# Patient Record
Sex: MALE | Race: WHITE | Employment: FULL TIME | URBAN - METROPOLITAN AREA
[De-identification: names, ages, dates, MRNs, and addresses within clinical notes are randomized per-mention and may not be internally consistent; named-entity substitution may affect disease eponyms.]

---

## 2017-02-27 ENCOUNTER — OFFICE VISIT (OUTPATIENT)
Dept: FAMILY MEDICINE | Facility: CLINIC | Age: 42
End: 2017-02-27
Payer: COMMERCIAL

## 2017-02-27 VITALS
HEIGHT: 70 IN | OXYGEN SATURATION: 96 % | DIASTOLIC BLOOD PRESSURE: 78 MMHG | WEIGHT: 270 LBS | BODY MASS INDEX: 38.65 KG/M2 | SYSTOLIC BLOOD PRESSURE: 118 MMHG | TEMPERATURE: 97.6 F | HEART RATE: 69 BPM

## 2017-02-27 DIAGNOSIS — F32.1 MAJOR DEPRESSIVE DISORDER, SINGLE EPISODE, MODERATE (H): ICD-10-CM

## 2017-02-27 DIAGNOSIS — E78.5 HYPERLIPIDEMIA LDL GOAL <100: ICD-10-CM

## 2017-02-27 DIAGNOSIS — F41.9 ANXIETY: ICD-10-CM

## 2017-02-27 DIAGNOSIS — R73.03 PREDIABETES: Primary | ICD-10-CM

## 2017-02-27 DIAGNOSIS — Z51.81 MEDICATION MONITORING ENCOUNTER: ICD-10-CM

## 2017-02-27 LAB
ANION GAP SERPL CALCULATED.3IONS-SCNC: 3 MMOL/L (ref 3–14)
BUN SERPL-MCNC: 22 MG/DL (ref 7–30)
CALCIUM SERPL-MCNC: 9.1 MG/DL (ref 8.5–10.1)
CHLORIDE SERPL-SCNC: 104 MMOL/L (ref 94–109)
CHOLEST SERPL-MCNC: 152 MG/DL
CO2 SERPL-SCNC: 32 MMOL/L (ref 20–32)
CREAT SERPL-MCNC: 1.03 MG/DL (ref 0.66–1.25)
GFR SERPL CREATININE-BSD FRML MDRD: 79 ML/MIN/1.7M2
GLUCOSE SERPL-MCNC: 112 MG/DL (ref 70–99)
HBA1C MFR BLD: 4.8 % (ref 4.3–6)
HDLC SERPL-MCNC: 38 MG/DL
LDLC SERPL CALC-MCNC: 91 MG/DL
NONHDLC SERPL-MCNC: 114 MG/DL
POTASSIUM SERPL-SCNC: 4.1 MMOL/L (ref 3.4–5.3)
SODIUM SERPL-SCNC: 139 MMOL/L (ref 133–144)
TRIGL SERPL-MCNC: 114 MG/DL

## 2017-02-27 PROCEDURE — 80061 LIPID PANEL: CPT | Performed by: FAMILY MEDICINE

## 2017-02-27 PROCEDURE — 99214 OFFICE O/P EST MOD 30 MIN: CPT | Performed by: FAMILY MEDICINE

## 2017-02-27 PROCEDURE — 80048 BASIC METABOLIC PNL TOTAL CA: CPT | Performed by: FAMILY MEDICINE

## 2017-02-27 PROCEDURE — 83036 HEMOGLOBIN GLYCOSYLATED A1C: CPT | Performed by: FAMILY MEDICINE

## 2017-02-27 PROCEDURE — 36415 COLL VENOUS BLD VENIPUNCTURE: CPT | Performed by: FAMILY MEDICINE

## 2017-02-27 ASSESSMENT — ANXIETY QUESTIONNAIRES
1. FEELING NERVOUS, ANXIOUS, OR ON EDGE: SEVERAL DAYS
6. BECOMING EASILY ANNOYED OR IRRITABLE: SEVERAL DAYS
2. NOT BEING ABLE TO STOP OR CONTROL WORRYING: NOT AT ALL
GAD7 TOTAL SCORE: 3
5. BEING SO RESTLESS THAT IT IS HARD TO SIT STILL: NOT AT ALL
7. FEELING AFRAID AS IF SOMETHING AWFUL MIGHT HAPPEN: NOT AT ALL
IF YOU CHECKED OFF ANY PROBLEMS ON THIS QUESTIONNAIRE, HOW DIFFICULT HAVE THESE PROBLEMS MADE IT FOR YOU TO DO YOUR WORK, TAKE CARE OF THINGS AT HOME, OR GET ALONG WITH OTHER PEOPLE: NOT DIFFICULT AT ALL
3. WORRYING TOO MUCH ABOUT DIFFERENT THINGS: SEVERAL DAYS

## 2017-02-27 ASSESSMENT — PATIENT HEALTH QUESTIONNAIRE - PHQ9: 5. POOR APPETITE OR OVEREATING: NOT AT ALL

## 2017-02-27 NOTE — NURSING NOTE
"Chief Complaint   Patient presents with     RECHECK       Initial /78  Pulse 69  Temp 97.6  F (36.4  C) (Oral)  Ht 5' 10\" (1.778 m)  Wt 270 lb (122.5 kg)  SpO2 96%  BMI 38.74 kg/m2 Estimated body mass index is 38.74 kg/(m^2) as calculated from the following:    Height as of this encounter: 5' 10\" (1.778 m).    Weight as of this encounter: 270 lb (122.5 kg)..  BP completed using cuff size: stefani Nix MA  "

## 2017-02-27 NOTE — PATIENT INSTRUCTIONS
2/27/17    Labs Ordered     [facility] [place]                        To reach your care team during and after hours:   [phone]  To reach our pharmacy:        [phone]    Clinic Hours                        Our clinic hours are:    Monday   7:30 am to 7:00 pm                  Tuesday through Friday 7:30 am to 5:00 pm                             Saturday   8:00 am to 12:00 pm      Sunday   Closed      Pharmacy Hours                        Our pharmacy hours are:    Monday   8:30 am to 7:00 pm       Tuesday to Friday  8:30 am to 6:00 pm                       Saturday    9:00 am to 1:00 pm              Sunday    Closed              There is also information available at our web site:  [email or web address]    If your provider ordered any lab tests and you do not receive the results within 10 business days, please call the clinic.    If you need a medication refill please contact your pharmacy.  Please allow 2-3 business days for your refill to be completed.    Our clinic offers telephone visits and e visits.  Please ask one of your team members to explain more.      Use [employer] (secure email communication and access to your chart) to send your primary care provider a message or make an appointment. Ask someone on your Team how to sign up for [employer].  Immunizations                      Immunization History   Administered Date(s) Administered     DPT 1975, 1975, 01/02/1976, 03/24/1978     Hepatitis B 02/19/1996, 03/16/1996     Influenza (H1N1) 01/06/2010     Influenza (IIV3) 10/27/2007, 11/15/2008, 11/21/2009, 10/02/2012     MMR 02/14/1977, 05/08/1990     OPV 1975, 1975, 01/02/1976, 04/24/1978     TD (ADULT, 7+) 03/24/1988, 07/29/2010, 12/16/2016     TDAP (ADACEL AGES 11-64) 01/25/2007     Varicella Not Indicated - By Hx 01/01/1984        Health Maintenance                         Health Maintenance Due   Topic Date Due     Flu Vaccine - yearly  09/01/2016

## 2017-02-27 NOTE — PROGRESS NOTES
SUBJECTIVE:                                                    Vlad Burgess is a 41 year old male who presents to clinic today for the following health issues:    Follow up on labs - elevated glucose and lipids - recent diet changes, weight down 28 lbs, stopped pop and decreased carbs - increased exercise - job promotion with improved work situatiuon    GAD7: (2) 3  The anxiety may be a little higher just received a promotion with more money.  However he does have more work which is what makes him slightly anxious.       PHQ9: (5) 3  Depression has been a lot better on the Zoloft.    Zoloft helps reduce the extreme's of the peaks and valleys.       Blood Pressure  BP Readings from Last 3 Encounters:   02/27/17 118/78   12/23/16 126/82   12/16/16 136/88       Lipids  Recent Labs   Lab Test  12/23/16   0825  02/16/15   1047   CHOL  172  158   HDL  39*  39*   LDL  104*  76   TRIG  143  215*   CHOLHDLRATIO   --   4.1     Creatinine   Date Value Ref Range Status   12/23/2016 0.88 0.66 - 1.25 mg/dL Final     Pre-Diabetes   Glucose   Date Value Ref Range Status   12/23/2016 123 (H) 70 - 99 mg/dL Final     Wt Readings from Last 5 Encounters:   02/27/17 270 lb (122.5 kg)   12/16/16 298 lb (135.2 kg)   01/18/16 289 lb (131.1 kg)   10/16/15 289 lb (131.1 kg)   03/28/15 292 lb (132.5 kg)       Problem list and histories reviewed & adjusted, as indicated.  Additional history: as documented    BP Readings from Last 3 Encounters:   02/27/17 118/78   12/23/16 126/82   12/16/16 136/88       Wt Readings from Last 4 Encounters:   02/27/17 270 lb (122.5 kg)   12/16/16 298 lb (135.2 kg)   01/18/16 289 lb (131.1 kg)   10/16/15 289 lb (131.1 kg)       Health Maintenance    Health Maintenance Due   Topic Date Due     INFLUENZA VACCINE (SYSTEM ASSIGNED)  09/01/2016       Current Problem List    Patient Active Problem List   Diagnosis     Psoriasis     Major depressive disorder, single episode, moderate (H)     Leg length discrepancy      Anxiety     Prediabetes     Hyperlipidemia LDL goal <100       Past Medical History    Past Medical History   Diagnosis Date     Alopecia areata 10/07     dr thomason     Anxiety      Hyperlipidemia LDL goal <100      Leg length discrepancy      Major depressive disorder, single episode, moderate (H)      Prediabetes      Psoriasis 2007       Past Surgical History    Past Surgical History   Procedure Laterality Date     Surgical history of -   2002     wisdom teeth     Pe tubes  1983       Current Medications    Current Outpatient Prescriptions   Medication Sig Dispense Refill     sertraline (ZOLOFT) 50 MG tablet Take 1 tablet (50 mg) by mouth daily 90 tablet 3       Allergies    Allergies   Allergen Reactions     Penicillins Hives       Immunizations    Immunization History   Administered Date(s) Administered     DPT 1975, 1975, 01/02/1976, 03/24/1978     Hepatitis B 02/19/1996, 03/16/1996     Influenza (H1N1) 01/06/2010     Influenza (IIV3) 10/27/2007, 11/15/2008, 11/21/2009, 10/02/2012     MMR 02/14/1977, 05/08/1990     OPV 1975, 1975, 01/02/1976, 04/24/1978     TD (ADULT, 7+) 03/24/1988, 07/29/2010, 12/16/2016     TDAP (ADACEL AGES 11-64) 01/25/2007     Varicella Not Indicated - By Hx 01/01/1984       Family History    Family History   Problem Relation Age of Onset     DIABETES Father      C.A.D. Father 72     CANCER Mother      lung cancer - age 45     C.A.D. Paternal Grandfather      age 40's     C.A.D. Maternal Grandmother      CEREBROVASCULAR DISEASE Maternal Grandfather        Social History    Social History     Social History     Marital status:      Spouse name: Suzan     Number of children: 3     Years of education: 14     Occupational History      [employer]     Social History Main Topics     Smoking status: Former Smoker     Packs/day: 0.50     Years: 15.00     Types: Cigarettes     Quit date: 10/14/2007     Smokeless tobacco: Never Used     Alcohol use 1.0 -  1.5 oz/week     2 - 3 Standard drinks or equivalent per week      Comment: 2-3 drinks per week avg     Drug use: No     Sexual activity: Yes     Partners: Female     Other Topics Concern     Caffeine Concern Yes     1-2 cans qd     Exercise Yes     work physical     Seat Belt Yes     Parent/Sibling W/ Cabg, Mi Or Angioplasty Before 65f 55m? No     Social History Narrative       Health Maintenance     Colonoscopy:  Due at 50   FIT:  Yearly Last 12/16 Due 12/17              PSA:  Yearly Last 12/16 Due 12/17   DEXA:  N/A    Health Maintenance Due   Topic Date Due     INFLUENZA VACCINE (SYSTEM ASSIGNED)  09/01/2016       Current Problem List    Patient Active Problem List   Diagnosis     Psoriasis     Major depressive disorder, single episode, moderate (H)     Leg length discrepancy     Anxiety     Prediabetes     Hyperlipidemia LDL goal <100       Past Medical History    Past Medical History   Diagnosis Date     Alopecia areata 10/07     dr thomason     Anxiety      Hyperlipidemia LDL goal <100      Leg length discrepancy      Major depressive disorder, single episode, moderate (H)      Prediabetes      Psoriasis 2007       Past Surgical History    Past Surgical History   Procedure Laterality Date     Surgical history of -   2002     wisdom teeth     Pe tubes  1983       Current Medications    Current Outpatient Prescriptions   Medication Sig Dispense Refill     sertraline (ZOLOFT) 50 MG tablet Take 1 tablet (50 mg) by mouth daily 90 tablet 3       Allergies    Allergies   Allergen Reactions     Penicillins Hives       Immunizations    Immunization History   Administered Date(s) Administered     DPT 1975, 1975, 01/02/1976, 03/24/1978     Hepatitis B 02/19/1996, 03/16/1996     Influenza (H1N1) 01/06/2010     Influenza (IIV3) 10/27/2007, 11/15/2008, 11/21/2009, 10/02/2012     MMR 02/14/1977, 05/08/1990     OPV 1975, 1975, 01/02/1976, 04/24/1978     TD (ADULT, 7+) 03/24/1988, 07/29/2010, 12/16/2016  "    TDAP (ADACEL AGES 11-64) 01/25/2007     Varicella Not Indicated - By Hx 01/01/1984       Family History    Family History   Problem Relation Age of Onset     DIABETES Father      C.A.D. Father 72     CANCER Mother      lung cancer - age 45     C.A.D. Paternal Grandfather      age 40's     C.A.D. Maternal Grandmother      CEREBROVASCULAR DISEASE Maternal Grandfather        Social History    Social History     Social History     Marital status:      Spouse name: Suzan     Number of children: 3     Years of education: 14     Occupational History      [employer]     Social History Main Topics     Smoking status: Former Smoker     Packs/day: 0.50     Years: 15.00     Types: Cigarettes     Quit date: 10/14/2007     Smokeless tobacco: Never Used     Alcohol use 1.0 - 1.5 oz/week     2 - 3 Standard drinks or equivalent per week      Comment: 2-3 drinks per week avg     Drug use: No     Sexual activity: Yes     Partners: Female     Other Topics Concern     Caffeine Concern Yes     1-2 cans qd     Exercise Yes     work physical     Seat Belt Yes     Parent/Sibling W/ Cabg, Mi Or Angioplasty Before 65f 55m? No     Social History Narrative         All above reviewed and updated, all stable unless otherwise noted    Recent labs reviewed    ROS:  Constitutional, HEENT, cardiovascular, pulmonary, GI, , musculoskeletal, neuro, skin, endocrine and psych systems are negative, except as in HPI or otherwise noted       OBJECTIVE:                                                    /78  Pulse 69  Temp 97.6  F (36.4  C) (Oral)  Ht 5' 10\" (1.778 m)  Wt 270 lb (122.5 kg)  SpO2 96%  BMI 38.74 kg/m2  Body mass index is 38.74 kg/(m^2).  GENERAL: healthy, alert and no distress Obese  EYES: Eyes grossly normal to inspection, extraocular movements - intact, and PERRL  HENT: ear canals- normal; TMs- normal; Nose- normal; Mouth- no ulcers, no lesions  NECK: no tenderness, no adenopathy, no asymmetry, no masses, no " stiffness; thyroid- normal to palpation  RESP: lungs clear to auscultation - no rales, no rhonchi, no wheezes  CV: regular rates and rhythm, normal S1 S2, no S3 or S4 and no murmur, no click or rub -  ABDOMEN: soft, no tenderness, no  hepatosplenomegaly, no masses, normal bowel sounds  MS: extremities- no gross deformities noted, no edema  SKIN: no suspicious lesions, no rashes  NEURO: strength and tone- normal, sensory exam- grossly normal, mentation- intact, speech- normal, reflexes- symmetric  BACK: no CVA tenderness, no paralumbar tenderness  PSYCH: Alert and oriented times 3; speech- coherent , normal rate and volume; able to articulate logical thoughts, able to abstract reason, no tangential thoughts, no hallucinations or delusions, affect- normal    DIAGNOSTICS/PROCEDURES:                                                      Reviewed      ASSESSMENT/PLAN:                                                        ICD-10-CM    1. Prediabetes R73.03 Basic metabolic panel  (Ca, Cl, CO2, Creat, Gluc, K, Na, BUN)     Lipid panel reflex to direct LDL     Hemoglobin A1c   2. Hyperlipidemia LDL goal <100 E78.5 Lipid panel reflex to direct LDL   3. Major depressive disorder, single episode, moderate (H) F32.1    4. Anxiety F41.9    5. Medication monitoring encounter Z51.81 Basic metabolic panel  (Ca, Cl, CO2, Creat, Gluc, K, Na, BUN)     Lipid panel reflex to direct LDL     Hemoglobin A1c       Discussed treatment/modality options, including risk and benefits, he desires advised alcohol consumption 1oz per day or less, advised 1 multivitamin per day, advised calcium 6656-7530 mg/d and Vitamin D 800-1200 IU/d, advised dentist every 6 months, advised diet, exercise, and weight loss, advised opthalmologist every 1-2 years, advised self testicular exam q month, further health care maintenance, further lab(s), LAWRENCE 7, completed and reviewed, PHQ-9, Depression Action Plan, completed and reviewed and observation. All diagnosis  above reviewed and noted above, otherwise stable.  See EpicCare orders for further details.  Follow up in 4-6 months and prn.    Labs ordered, PHQ9 completed, LAWRENCE 7 completed, continued diet, weight loss and exercise    Health Maintenance Due   Topic Date Due     INFLUENZA VACCINE (SYSTEM ASSIGNED)  09/01/2016       See Patient Instructions    This document serves as a record of the services and decisions personally performed and made by Rex Yee MD [facility]. It was created on their behalf by Lino Tsai, a trained medical scribe. The creation of this document is based the provider's statements to the medical scribe.  Lino Tsai February 27, 2017 7:55 AM               Rex Yee MD [address]  [id number] [phone] Fax

## 2017-02-27 NOTE — MR AVS SNAPSHOT
After Visit Summary   2/27/2017    Vlad Burgess    MRN: [id number]           Patient Information     Date Of Birth          1975        Visit Information        Provider Department      2/27/2017 7:40 AM Rex Yee MD [facility]        Today's Diagnoses     Prediabetes    -  1    Hyperlipidemia LDL goal <100        Major depressive disorder, single episode, moderate (H)        Anxiety        Medication monitoring encounter          Care Instructions    2/27/17    Labs Ordered     [facility]                        To reach your care team during and after hours:   [phone]  To reach our pharmacy:        [phone]    Clinic Hours                        Our clinic hours are:    Monday   7:30 am to 7:00 pm                  Tuesday through Friday 7:30 am to 5:00 pm                             Saturday   8:00 am to 12:00 pm      Sunday   Closed      Pharmacy Hours                        Our pharmacy hours are:    Monday   8:30 am to 7:00 pm       Tuesday to Friday  8:30 am to 6:00 pm                       Saturday    9:00 am to 1:00 pm              Sunday    Closed              There is also information available at our web site:  [email or web address]    If your provider ordered any lab tests and you do not receive the results within 10 business days, please call the clinic.    If you need a medication refill please contact your pharmacy.  Please allow 2-3 business days for your refill to be completed.    Our clinic offers telephone visits and e visits.  Please ask one of your team members to explain more.      Use [facility] (secure email communication and access to your chart) to send your primary care provider a message or make an appointment. Ask someone on your Team how to sign up for [facility].  Immunizations                      Immunization History   Administered Date(s) Administered     DPT 1975, 1975, 01/02/1976, 03/24/1978     Hepatitis B  "1996, 1996     Influenza (H1N1) 2010     Influenza (IIV3) 10/27/2007, 11/15/2008, 2009, 10/02/2012     MMR 1977, 1990     OPV 1975, 1975, 1976, 1978     TD (ADULT, 7+) 1988, 2010, 2016     TDAP (ADACEL AGES 11-64) 2007     Varicella Not Indicated - By Hx 1984        Health Maintenance                         Health Maintenance Due   Topic Date Due     Flu Vaccine - yearly  2016             Follow-ups after your visit        Who to contact     If you have questions or need follow up information about today's clinic visit or your schedule please contact [facility] [place] directly at [phone].  Normal or non-critical lab and imaging results will be communicated to you by MyChart, letter or phone within 4 business days after the clinic has received the results. If you do not hear from us within 7 days, please contact the clinic through [employer] or phone. If you have a critical or abnormal lab result, we will notify you by phone as soon as possible.  Submit refill requests through [employer] or call your pharmacy and they will forward the refill request to us. Please allow 3 business days for your refill to be completed.          Additional Information About Your Visit        [employer] Information     [employer] lets you send messages to your doctor, view your test results, renew your prescriptions, schedule appointments and more. To sign up, go to [email or web address] . Click on \"Log in\" on the left side of the screen, which will take you to the Welcome page. Then click on \"Sign up Now\" on the right side of the page.     You will be asked to enter the access code listed below, as well as some personal information. Please follow the directions to create your username and password.     Your access code is: [email or web address]  Expires: 3/16/2017  4:28 PM     Your access code will  in 90 days. If you need help or a new " "code, please call your [facility] clinic or [phone].        Care EveryWhere ID     This is your Care EveryWhere ID. This could be used by other organizations to access your [facility] medical records  [id number]        Your Vitals Were     Pulse Temperature Height Pulse Oximetry BMI (Body Mass Index)       69 97.6  F (36.4  C) (Oral) 5' 10\" (1.778 m) 96% 38.74 kg/m2        Blood Pressure from Last 3 Encounters:   02/27/17 118/78   12/23/16 126/82   12/16/16 136/88    Weight from Last 3 Encounters:   02/27/17 270 lb (122.5 kg)   12/16/16 298 lb (135.2 kg)   01/18/16 289 lb (131.1 kg)              Today, you had the following     No orders found for display       Primary Care Provider Office Phone # Fax #    Rex Yee [phone]       [address] [address]        Thank you!     Thank you for choosing [facility]  for your care. Our goal is always to provide you with excellent care. Hearing back from our patients is one way we can continue to improve our services. Please take a few minutes to complete the written survey that you may receive in the mail after your visit with us. Thank you!             Your Updated Medication List - Protect others around you: Learn how to safely use, store and throw away your medicines at www.disposemymeds.org.          This list is accurate as of: 2/27/17  8:19 AM.  Always use your most recent med list.                   Brand Name Dispense Instructions for use    sertraline 50 MG tablet    ZOLOFT    90 tablet    Take 1 tablet (50 mg) by mouth daily         "

## 2017-02-27 NOTE — LETTER
[facility]  [address] [place] [address]  [place] [address]                  [phone]   February 28, 2017    Vlad Burgess  [address] [place] [id number]      Dear Vlad,    Here is a summary of your recent test results:    Labs are overall improved.     We advise:    Follow up in 3-4 months and as needed.    For additional lab test information, [email or web address] is an excellent reference.    Your test results are enclosed.      Please contact me if you have any questions.    In addition, here is a list of due or overdue Health Maintenance reminders.    Health Maintenance Due   Topic Date Due     Flu Vaccine - yearly  09/01/2016       Please call us at [phone] (or use [employer]) to address the above recommendations.            Thank you very much for trusting [facility]..     Healthy regards,        Rex Yee M.D.        Results for orders placed or performed in visit on 02/27/17   Basic metabolic panel  (Ca, Cl, CO2, Creat, Gluc, K, Na, BUN)   Result Value Ref Range    Sodium 139 133 - 144 mmol/L    Potassium 4.1 3.4 - 5.3 mmol/L    Chloride 104 94 - 109 mmol/L    Carbon Dioxide 32 20 - 32 mmol/L    Anion Gap 3 3 - 14 mmol/L    Glucose 112 (H) 70 - 99 mg/dL    Urea Nitrogen 22 7 - 30 mg/dL    Creatinine 1.03 0.66 - 1.25 mg/dL    GFR Estimate 79 >60 mL/min/1.7m2    GFR Estimate If Black >90   GFR Calc   >60 mL/min/1.7m2    Calcium 9.1 8.5 - 10.1 mg/dL   Lipid panel reflex to direct LDL   Result Value Ref Range    Cholesterol 152 <200 mg/dL    Triglycerides 114 <150 mg/dL    HDL Cholesterol 38 (L) >39 mg/dL    LDL Cholesterol Calculated 91 <100 mg/dL    Non HDL Cholesterol 114 <130 mg/dL   Hemoglobin A1c   Result Value Ref Range    Hemoglobin A1C 4.8 4.3 - 6.0 %

## 2017-02-28 ASSESSMENT — ANXIETY QUESTIONNAIRES: GAD7 TOTAL SCORE: 3

## 2017-02-28 ASSESSMENT — PATIENT HEALTH QUESTIONNAIRE - PHQ9: SUM OF ALL RESPONSES TO PHQ QUESTIONS 1-9: 3

## 2018-02-06 ENCOUNTER — TELEPHONE (OUTPATIENT)
Dept: FAMILY MEDICINE | Facility: CLINIC | Age: 43
End: 2018-02-06

## 2018-02-06 NOTE — TELEPHONE ENCOUNTER
Huddled with Dr Yee and MD wanted to see the pt in clinic tomorrow.  Pt appt booked at 2 pm on Wednesday.    Bev Rajan RN  [employer]

## 2018-02-06 NOTE — TELEPHONE ENCOUNTER
Genitourinary - Male  Onset: groin pain started in September, pain from navel to Testicular are started Saturday after heavy lifting    Description:   Dysuria (painful urination): no   Hematuria (blood in urine): no   Frequency: no   Are you urinating at night : no   Hesitancy (delay in urine): no   Retention (unable to empty): no   Decrease in urinary flow: no   Incontinence: no     Progression of Symptoms:  same    Accompanying Signs & Symptoms:  Fever: no   Back/Flank pain: no   Urethral discharge: no   Testicle lumps/masses/pain: no   Nausea and/or vomiting: no   Abdominal pain: no     History:   History of frequent UTI's: no   History of kidney stones: no   History of hernias: no , denies lumps  Personal or Family history of Prostate problems: no      Precipitating factors:     Pt called stating that he has been experiencing intermittent left groin pain that radiates down his left leg that started last September after falling off of a ladder.  Pt denies feeling any palpable lump.  Pt denies urinary symptoms. Pt states that the pain is intermittent and states that he notices the pain if he is on the ground and tries to lift his leg, states that he will feel a sharp pain and states that he limps for awhile after.  Pt states that the pain is relieved by rest.  Pt also states that he started having pain from his naval to his testicular area that started on Saturday.  Pt states that he was lifting a heavy box when the pain started.  Pt denies feeling a palpable lump, denies abdominal pain, nausea or vomiting, denies difficulty urinating, denies swelling.      Alleviating factors:  Rest    Will huddle with provider for further recommendations.    Bev Rajan RN  [employer]

## 2018-02-07 ENCOUNTER — OFFICE VISIT (OUTPATIENT)
Dept: FAMILY MEDICINE | Facility: CLINIC | Age: 43
End: 2018-02-07
Payer: COMMERCIAL

## 2018-02-07 VITALS
BODY MASS INDEX: 44.52 KG/M2 | HEIGHT: 70 IN | OXYGEN SATURATION: 93 % | WEIGHT: 311 LBS | DIASTOLIC BLOOD PRESSURE: 86 MMHG | SYSTOLIC BLOOD PRESSURE: 130 MMHG | TEMPERATURE: 97.8 F | HEART RATE: 95 BPM

## 2018-02-07 DIAGNOSIS — M54.50 CHRONIC MIDLINE LOW BACK PAIN WITHOUT SCIATICA: ICD-10-CM

## 2018-02-07 DIAGNOSIS — G89.29 CHRONIC MIDLINE LOW BACK PAIN WITHOUT SCIATICA: ICD-10-CM

## 2018-02-07 DIAGNOSIS — F32.1 MAJOR DEPRESSIVE DISORDER, SINGLE EPISODE, MODERATE (H): ICD-10-CM

## 2018-02-07 DIAGNOSIS — E66.01 MORBID OBESITY (H): ICD-10-CM

## 2018-02-07 DIAGNOSIS — M79.605 PAIN OF LEFT LOWER EXTREMITY: Primary | ICD-10-CM

## 2018-02-07 DIAGNOSIS — F41.9 ANXIETY: ICD-10-CM

## 2018-02-07 DIAGNOSIS — Z51.81 MEDICATION MONITORING ENCOUNTER: ICD-10-CM

## 2018-02-07 PROCEDURE — 99214 OFFICE O/P EST MOD 30 MIN: CPT | Performed by: FAMILY MEDICINE

## 2018-02-07 ASSESSMENT — ANXIETY QUESTIONNAIRES
2. NOT BEING ABLE TO STOP OR CONTROL WORRYING: NOT AT ALL
GAD7 TOTAL SCORE: 2
6. BECOMING EASILY ANNOYED OR IRRITABLE: NOT AT ALL
7. FEELING AFRAID AS IF SOMETHING AWFUL MIGHT HAPPEN: SEVERAL DAYS
IF YOU CHECKED OFF ANY PROBLEMS ON THIS QUESTIONNAIRE, HOW DIFFICULT HAVE THESE PROBLEMS MADE IT FOR YOU TO DO YOUR WORK, TAKE CARE OF THINGS AT HOME, OR GET ALONG WITH OTHER PEOPLE: NOT DIFFICULT AT ALL
1. FEELING NERVOUS, ANXIOUS, OR ON EDGE: NOT AT ALL
3. WORRYING TOO MUCH ABOUT DIFFERENT THINGS: SEVERAL DAYS
5. BEING SO RESTLESS THAT IT IS HARD TO SIT STILL: NOT AT ALL

## 2018-02-07 ASSESSMENT — PATIENT HEALTH QUESTIONNAIRE - PHQ9: 5. POOR APPETITE OR OVEREATING: NOT AT ALL

## 2018-02-07 NOTE — PROGRESS NOTES
"  SUBJECTIVE:   Vlad Burgess is a 42 year old male who presents to clinic today for the following health issues:    Anxiety/Depression -- Vlad reported he is most concerned about his overeating habits - he feels he \"is never full\". He is also stressed about a new job, where he has increased responsibility with decreased activity level.     PHQ-9: 6  LAWRENCE-7: 2    Low Back Pain -- Vlad complained of a midline low back pain with \"strong, dull ache\" without radiation. He stated he has issues with stool leaking - but this is ongoing since childhood. He denied any dysuria issues.     Genitourinary - Male  Onset: groin pain started in September, pain from umbilical to testicular area started Saturday after heavy lifting    Description:   Dysuria (painful urination): no   Hematuria (blood in urine): no   Frequency: no   Are you urinating at night : no   Hesitancy (delay in urine): no   Retention (unable to empty): no   Decrease in urinary flow: no   Incontinence: no     Progression of Symptoms:  same    Accompanying Signs & Symptoms:  Fever: no   Back/Flank pain: no   Urethral discharge: no   Testicle lumps/masses/pain: no   Nausea and/or vomiting: no   Abdominal pain: no     History:   History of frequent UTI's: no   History of kidney stones: no   History of hernias: no , denies lumps  Personal or Family history of Prostate problems: no    Precipitating factors:     Pt called stating that he has been experiencing intermittent left groin pain that radiates down his left leg that started last September after falling off of a ladder.  Pt denies feeling any palpable lump.  Pt denies urinary symptoms. Pt states that the pain is intermittent and states that he notices the pain if he is on the ground and tries to lift his leg, states that he will feel a sharp pain and states that he limps for awhile after.  Pt states that the pain is relieved by rest.  Pt also states that he started having pain from his naval to his testicular " area that started on Saturday.  Pt states that he was lifting a heavy box when the pain started.  Pt denies feeling a palpable lump, denies abdominal pain, nausea or vomiting, denies difficulty urinating, denies swelling.      Alleviating factors: Rest      Problem list and histories reviewed & adjusted, as indicated.  Additional history: as documented    Reviewed and updated as needed this visit by clinical staff  Tobacco  Allergies  Meds  Med Hx  Surg Hx  Fam Hx  Soc Hx      Reviewed and updated as needed this visit by Provider       BP Readings from Last 3 Encounters:   02/07/18 130/86   02/27/17 118/78   12/23/16 126/82     Wt Readings from Last 4 Encounters:   02/07/18 (!) 311 lb (141.1 kg)   02/27/17 270 lb (122.5 kg)   12/16/16 298 lb (135.2 kg)   01/18/16 289 lb (131.1 kg)       Health Maintenance    Health Maintenance Due   Topic Date Due     INFLUENZA VACCINE (SYSTEM ASSIGNED)  09/01/2017     PSA Q1 YR  12/23/2017     FIT Q1 YR  12/26/2017     LIPID MONITORING Q1 YEAR  02/27/2018       Current Problem List    Patient Active Problem List   Diagnosis     Psoriasis     Major depressive disorder, single episode, moderate (H)     Leg length discrepancy     Anxiety     Prediabetes     Hyperlipidemia LDL goal <100     Groin pain     Bilateral low back pain without sciatica       Past Medical History    Past Medical History:   Diagnosis Date     Alopecia areata 10/07    dr thomason     Anxiety      Hyperlipidemia LDL goal <100      Leg length discrepancy      Major depressive disorder, single episode, moderate (H)      Prediabetes      Psoriasis 2007       Past Surgical History    Past Surgical History:   Procedure Laterality Date     PE TUBES  1983     SURGICAL HISTORY OF -   2002    wisdom teeth       Current Medications    No current outpatient prescriptions on file.       Allergies    Allergies   Allergen Reactions     Penicillins Hives       Immunizations    Immunization History   Administered Date(s)  Administered     HepB 02/19/1996, 03/16/1996     Historical DTP/aP 1975, 1975, 01/02/1976, 03/24/1978     Influenza (H1N1) 01/06/2010     Influenza (IIV3) PF 10/27/2007, 11/15/2008, 11/21/2009, 10/02/2012     MMR 02/14/1977, 05/08/1990     OPV, trivalent, live 1975, 1975, 01/02/1976, 04/24/1978     TD (ADULT, 7+) 03/24/1988, 07/29/2010, 12/16/2016     TDAP Vaccine (Adacel) 01/25/2007     Varicella Pt Report Hx of Varicella/Chicken Pox 01/01/1984       Family History    Family History   Problem Relation Age of Onset     DIABETES Father      C.A.D. Father 72     CANCER Mother      lung cancer - age 45     C.A.D. Paternal Grandfather      age 40's     C.A.D. Maternal Grandmother      CEREBROVASCULAR DISEASE Maternal Grandfather        Social History    Social History     Social History     Marital status:      Spouse name: Suzan     Number of children: 3     Years of education: 14     Occupational History      [employer]     Social History Main Topics     Smoking status: Former Smoker     Packs/day: 0.50     Years: 15.00     Types: Cigarettes     Quit date: 10/14/2007     Smokeless tobacco: Never Used     Alcohol use 1.0 - 1.5 oz/week     2 - 3 Standard drinks or equivalent per week      Comment: 2-3 drinks per week avg     Drug use: No     Sexual activity: Yes     Partners: Female     Other Topics Concern     Caffeine Concern Yes     1-2 cans qd     Exercise Yes     work physical     Seat Belt Yes     Parent/Sibling W/ Cabg, Mi Or Angioplasty Before 65f 55m? No     Social History Narrative       All above reviewed and updated, all stable unless otherwise noted    Recent labs reviewed    ROS:  Constitutional, HEENT, cardiovascular, pulmonary, GI, , musculoskeletal, neuro, skin, endocrine and psych systems are negative, except as in HPI or otherwise noted     This document serves as a record of the services and decisions personally performed and made by Rex Yee MD  "FAAFP. It was created on their behalf by Lino Aldana, a trained medical scribe. The creation of this document is based the provider's statements to the medical scribe.  Lino Aldana February 7, 2018 2:27 PM      OBJECTIVE:                                                    /86  Pulse 95  Temp 97.8  F (36.6  C) (Oral)  Ht 5' 10\" (1.778 m)  Wt (!) 311 lb (141.1 kg)  SpO2 93%  BMI 44.62 kg/m2  Body mass index is 44.62 kg/(m^2).  GENERAL: healthy, alert and no distress, morbidly obese   RESP: lungs clear to auscultation - no rales, no rhonchi, no wheezes  CV: regular rates and rhythm, normal S1 S2, no S3 or S4 and no murmur, no click or rub -  MS: extremities- no gross deformities noted, no edema  SKIN: no suspicious lesions, no rashes to visible skin  : pain to palpation of left groin, testicles normal without atrophy or masses, no hernias, penis normal without urethral discharge  NEURO: mentation intact and speech normal  PSYCH: affect normal    THORACIC/LUMBAR SPINE  Inspection:    No redness, swelling, overlying skin change, or gross deformity/asymmetry  Range of Motion:     Lumbar flexion within normal limits    Lumbar extension within normal limits    Lumbar rotation within normal limits    Lumbar lateral flexion within normal limits  Special Tests:    Positive: pain with left hip adduction/IR    Negative: bilateral log roll, straight leg raise    DIAGNOSTICS/PROCEDURES:                                                      No results found for this or any previous visit (from the past 24 hour(s)).     ASSESSMENT/PLAN:                                                        ICD-10-CM    1. Pain of left lower extremity M79.605 PHYSICAL THERAPY REFERRAL   2. Chronic midline low back pain without sciatica M54.5 PHYSICAL THERAPY REFERRAL    G89.29    3. Major depressive disorder, single episode, moderate (H) F32.1    4. Anxiety F41.9    5. Morbid obesity (H) E66.01 BARIATRIC ADULT REFERRAL   6. Medication " monitoring encounter Z51.81      Discussed treatment/modality options, including risk and benefits, he desires advised diet, exercise, and weight loss, follow up with another visit, further health care maintenance, heat/ice/stretching & exercise, OTC meds, physical therapy, referrals (Weight Managment), and observation. All diagnosis above reviewed and noted above, otherwise stable.  See [facility] orders for further details.  Follow up as needed.    Health Maintenance Due   Topic Date Due     INFLUENZA VACCINE (SYSTEM ASSIGNED)  09/01/2017     PSA Q1 YR  12/23/2017     FIT Q1 YR  12/26/2017     LIPID MONITORING Q1 YEAR  02/27/2018     Patient Instructions     Follow up with Physical Therapy, as discussed    Heat/ice/stretching as needed    Follow up with N Weight Management, as discussed    Ibuprofen 800 mg every 8 hours, as needed    The information in this document, created by the medical scribe for me, accurately reflects the services I personally performed and the decisions made by me. I have reviewed and approved this document for accuracy.   Rex Yee MD FAAFP            Rex Yee MD [address]  [phone] Fax

## 2018-02-07 NOTE — MR AVS SNAPSHOT
After Visit Summary   2/7/2018    Vlad Burgess    MRN: [id number]           Patient Information     Date Of Birth          1975        Visit Information        Provider Department      2/7/2018 2:00 PM Rex Yee MD [facility]        Today's Diagnoses     Pain of left lower extremity    -  1    Chronic midline low back pain without sciatica        Major depressive disorder, single episode, moderate (H)        Anxiety        Morbid obesity (H)        Medication monitoring encounter          Care Instructions    Follow up with Physical Therapy, as discussed    Heat/ice/stretching as needed    Follow up with N Weight Management, as discussed    Ibuprofen 800 mg every 8 hours, as needed      [facility]                        To reach your care team during and after hours:   [phone]  To reach our pharmacy:        [phone]    Clinic Hours                        Our clinic hours are:    Monday   7:30 am to 7:00 pm                  Tuesday through Friday 7:30 am to 5:00 pm                             Saturday   8:00 am to 12:00 pm      Sunday   Closed      Pharmacy Hours                        Our pharmacy hours are:    Monday   8:30 am to 7:00 pm       Tuesday to Friday  8:30 am to 6:00 pm                       Saturday    9:00 am to 1:00 pm              Sunday    Closed              There is also information available at our web site:  [email or web address]    If your provider ordered any lab tests and you do not receive the results within 10 business days, please call the clinic.    If you need a medication refill please contact your pharmacy.  Please allow 2-3 business days for your refill to be completed.    Our clinic offers telephone visits and e visits.  Please ask one of your team members to explain more.      Use [employer] (secure email communication and access to your chart) to send your primary care provider a message or make an appointment. Ask  someone on your Team how to sign up for [employer].  Immunizations                      Immunization History   Administered Date(s) Administered     HepB 02/19/1996, 03/16/1996     Historical DTP/aP 1975, 1975, 01/02/1976, 03/24/1978     Influenza (H1N1) 01/06/2010     Influenza (IIV3) PF 10/27/2007, 11/15/2008, 11/21/2009, 10/02/2012     MMR 02/14/1977, 05/08/1990     OPV, trivalent, live 1975, 1975, 01/02/1976, 04/24/1978     TD (ADULT, 7+) 03/24/1988, 07/29/2010, 12/16/2016     TDAP Vaccine (Adacel) 01/25/2007     Varicella Pt Report Hx of Varicella/Chicken Pox 01/01/1984        Health Maintenance                         Health Maintenance Due   Topic Date Due     Depression Assessment - every 6 months  08/27/2017     Flu Vaccine - yearly  09/01/2017     Depression Action Plan Review - yearly  12/16/2017     Prostate Test (PSA) - yearly  12/23/2017     Colon Cancer Screening - FIT Test - yearly  12/26/2017     Cholesterol Lab - yearly  02/27/2018               Follow-ups after your visit        Additional Services     BARIATRIC ADULT REFERRAL       Your provider has referred you to: [facility]: [facility] [phone]. [email or web address]    Please be aware that coverage of these services is subject to the terms and limitations of your health insurance plan.  Call member services at your health plan with any benefit or coverage questions.      Please bring the following with you to your appointment:      (1) List of current medications   (2) This referral request   (3) Any documents/labs given to you for this referral            PHYSICAL THERAPY REFERRAL       *This therapy referral will be filtered to a centralized scheduling office at [facility] and the patient will receive a call to schedule an appointment at a [place] location most convenient for them. *     Works  "in [place] in [facility] provides Physical Therapy evaluation and treatment and many specialty services across the [facility] system.  If requesting a specialty program, please choose from the list below.    If you have not heard from the scheduling office within 2 business days, please call [phone] for all locations, with the exception of [place], please call [phone].  Treatment: Evaluation & Treatment  Special Instructions/Modalities: PT  Special Programs: None    Please be aware that coverage of these services is subject to the terms and limitations of your health insurance plan.  Call member services at your health plan with any benefit or coverage questions.      **Note to Provider:  If you are referring outside of [facility] for the therapy appointment, please list the name of the location in the \"special instructions\" above, print the referral and give to the patient to schedule the appointment.                  Follow-up notes from your care team     Return if symptoms worsen or fail to improve.      Who to contact     If you have questions or need follow up information about today's clinic visit or your schedule please contact [facility] directly at [phone].  Normal or non-critical lab and imaging results will be communicated to you by [employer], letter or phone within 4 business days after the clinic has received the results. If you do not hear from us within 7 days, please contact the clinic through [employer] or phone. If you have a critical or abnormal lab result, we will notify you by phone as soon as possible.  Submit refill requests through [employer] or call your pharmacy and they will forward the refill request to us. Please allow 3 business days for your refill to be completed.          Additional Information About Your Visit        [employer] Information     [employer] lets you send messages to your doctor, view your test results, renew your " "prescriptions, schedule appointments and more. To sign up, go to [email or web address] . Click on \"Log in\" on the left side of the screen, which will take you to the Welcome page. Then click on \"Sign up Now\" on the right side of the page.     You will be asked to enter the access code listed below, as well as some personal information. Please follow the directions to create your username and password.     Your access code is: [email or web address]  Expires: 2018  2:40 PM     Your access code will  in 90 days. If you need help or a new code, please call your [place] clinic or [phone].        Care EveryWhere ID     This is your Care EveryWhere ID. This could be used by other organizations to access your [place] medical records  [id number]        Your Vitals Were     Pulse Temperature Height Pulse Oximetry BMI (Body Mass Index)       95 97.8  F (36.6  C) (Oral) 5' 10\" (1.778 m) 93% 44.62 kg/m2        Blood Pressure from Last 3 Encounters:   18 130/86   17 118/78   16 126/82    Weight from Last 3 Encounters:   18 (!) 311 lb (141.1 kg)   17 270 lb (122.5 kg)   16 298 lb (135.2 kg)              We Performed the Following     BARIATRIC ADULT REFERRAL     DEPRESSION ACTION PLAN (DAP)     PHYSICAL THERAPY REFERRAL        Primary Care Provider Office Phone # Fax #    Rex Yee [phone] [address] [facility] [facility] [address]        Equal Access to Services     [facility] : Hadrenan Frankel, graeme parrish, lydia young. So [facility] [phone].    ATENCIÓN: Si habla español, tiene a gonzalez disposición servicios gratuitos de asistencia lingüística. Llame al [phone].    We comply with applicable federal civil rights laws and [place] laws. We do not discriminate on the basis of race, color, national origin, age, disability, sex, sexual orientation, or gender identity.          "   Thank you!     Thank you for choosing [facility]  for your care. Our goal is always to provide you with excellent care. Hearing back from our patients is one way we can continue to improve our services. Please take a few minutes to complete the written survey that you may receive in the mail after your visit with us. Thank you!             Your Updated Medication List - Protect others around you: Learn how to safely use, store and throw away your medicines at www.disposemymeds.org.      Notice  As of 2/7/2018  2:40 PM    You have not been prescribed any medications.

## 2018-02-07 NOTE — NURSING NOTE
"Chief Complaint   Patient presents with     Groin Pain       Initial /86  Pulse 95  Temp 97.8  F (36.6  C) (Oral)  Ht 5' 10\" (1.778 m)  Wt (!) 311 lb (141.1 kg)  SpO2 93%  BMI 44.62 kg/m2 Estimated body mass index is 44.62 kg/(m^2) as calculated from the following:    Height as of this encounter: 5' 10\" (1.778 m).    Weight as of this encounter: 311 lb (141.1 kg)..  BP completed using cuff size: stefani Nix MA  "

## 2018-02-07 NOTE — LETTER
My Depression Action Plan  Name: Vlad Burgess   Date of Birth 1975  Date: 2/7/2018    My doctor: Rex Yee   My clinic: [address] [id number] [phone]          GREEN    ZONE   Good Control    What it looks like:     Things are going generally well. You have normal up s and down s. You may even feel depressed from time to time, but bad moods usually last less than a day.   What you need to do:  1. Continue to care for yourself (see self care plan)  2. Check your depression survival kit and update it as needed  3. Follow your physician s recommendations including any medication.  4. Do not stop taking medication unless you consult with your physician first.           YELLOW         ZONE Getting Worse    What it looks like:     Depression is starting to interfere with your life.     It may be hard to get out of bed; you may be starting to isolate yourself from others.    Symptoms of depression are starting to last most all day and this has happened for several days.     You may have suicidal thoughts but they are not constant.   What you need to do:     1. Call your care team, your response to treatment will improve if you keep your care team informed of your progress. Yellow periods are signs an adjustment may need to be made.     2. Continue your self-care, even if you have to fake it!    3. Talk to someone in your support network    4. Open up your depression survival kit           RED    ZONE Medical Alert - Get Help    What it looks like:     Depression is seriously interfering with your life.     You may experience these or other symptoms: You can t get out of bed most days, can t work or engage in other necessary activities, you have trouble taking care of basic hygiene, or basic responsibilities, thoughts of suicide or death that will not go away, self-injurious behavior.     What you need to do:  1. Call your care team and  request a same-day appointment. If they are not available (weekends or after hours) call your local crisis line, emergency room or 911.      Electronically signed by: Pat Nix, February 7, 2018    Depression Self Care Plan / Survival Kit    Self-Care for Depression  Here s the deal. Your body and mind are really not as separate as most people think.  What you do and think affects how you feel and how you feel influences what you do and think. This means if you do things that people who feel good do, it will help you feel better.  Sometimes this is all it takes.  There is also a place for medication and therapy depending on how severe your depression is, so be sure to consult with your medical provider and/ or Behavioral Health Consultant if your symptoms are worsening or not improving.     In order to better manage my stress, I will:    Exercise  Get some form of exercise, every day. This will help reduce pain and release endorphins, the  feel good  chemicals in your brain. This is almost as good as taking antidepressants!  This is not the same as joining a gym and then never going! (they count on that by the way ) It can be as simple as just going for a walk or doing some gardening, anything that will get you moving.      Hygiene   Maintain good hygiene (Get out of bed in the morning, Make your bed, Brush your teeth, Take a shower, and Get dressed like you were going to work, even if you are unemployed).  If your clothes don't fit try to get ones that do.    Diet  I will strive to eat foods that are good for me, drink plenty of water, and avoid excessive sugar, caffeine, alcohol, and other mood-altering substances.  Some foods that are helpful in depression are: complex carbohydrates, B vitamins, flaxseed, fish or fish oil, fresh fruits and vegetables.    Psychotherapy  I agree to participate in Individual Therapy (if recommended).    Medication  If prescribed medications, I agree to take them.  Missing  doses can result in serious side effects.  I understand that drinking alcohol, or other illicit drug use, may cause potential side effects.  I will not stop my medication abruptly without first discussing it with my provider.    Staying Connected With Others  I will stay in touch with my friends, family members, and my primary care provider/team.    Use your imagination  Be creative.  We all have a creative side; it doesn t matter if it s oil painting, sand castles, or mud pies! This will also kick up the endorphins.    Witness Beauty  (AKA stop and smell the roses) Take a look outside, even in mid-winter. Notice colors, textures. Watch the squirrels and birds.     Service to others  Be of service to others.  There is always someone else in need.  By helping others we can  get out of ourselves  and remember the really important things.  This also provides opportunities for practicing all the other parts of the program.    Humor  Laugh and be silly!  Adjust your TV habits for less news and crime-drama and more comedy.    Control your stress  Try breathing deep, massage therapy, biofeedback, and meditation. Find time to relax each day.     My support system    Clinic Contact:  Phone number:    Contact 1:  Phone number:    Contact 2:  Phone number:    Muslim/:  Phone number:    Therapist:  Phone number:    Local crisis center:    Phone number:    Other community support:  Phone number:

## 2018-02-07 NOTE — PATIENT INSTRUCTIONS
Follow up with Physical Therapy, as discussed    Heat/ice/stretching as needed    Follow up with [facility], as discussed    Ibuprofen 800 mg every 8 hours, as needed      [facility] [place]                        To reach your care team during and after hours:   [phone]  To reach our pharmacy:        [phone]    Clinic Hours                        Our clinic hours are:    Monday   7:30 am to 7:00 pm                  Tuesday through Friday 7:30 am to 5:00 pm                             Saturday   8:00 am to 12:00 pm      Sunday   Closed      Pharmacy Hours                        Our pharmacy hours are:    Monday   8:30 am to 7:00 pm       Tuesday to Friday  8:30 am to 6:00 pm                       Saturday    9:00 am to 1:00 pm              Sunday    Closed              There is also information available at our web site:  [email or web address]    If your provider ordered any lab tests and you do not receive the results within 10 business days, please call the clinic.    If you need a medication refill please contact your pharmacy.  Please allow 2-3 business days for your refill to be completed.    Our clinic offers telephone visits and e visits.  Please ask one of your team members to explain more.      Use [employer] (secure email communication and access to your chart) to send your primary care provider a message or make an appointment. Ask someone on your Team how to sign up for [email or web address].  Immunizations                      Immunization History   Administered Date(s) Administered     HepB 02/19/1996, 03/16/1996     Historical DTP/aP 1975, 1975, 01/02/1976, 03/24/1978     Influenza (H1N1) 01/06/2010     Influenza (IIV3) PF 10/27/2007, 11/15/2008, 11/21/2009, 10/02/2012     MMR 02/14/1977, 05/08/1990     OPV, trivalent, live 1975, 1975, 01/02/1976, 04/24/1978     TD (ADULT, 7+) 03/24/1988, 07/29/2010, 12/16/2016     TDAP Vaccine (Adacel) 01/25/2007     Varicella Pt  Report Hx of Varicella/Chicken Pox 01/01/1984        Health Maintenance                         Health Maintenance Due   Topic Date Due     Depression Assessment - every 6 months  08/27/2017     Flu Vaccine - yearly  09/01/2017     Depression Action Plan Review - yearly  12/16/2017     Prostate Test (PSA) - yearly  12/23/2017     Colon Cancer Screening - FIT Test - yearly  12/26/2017     Cholesterol Lab - yearly  02/27/2018

## 2018-02-08 ASSESSMENT — ANXIETY QUESTIONNAIRES: GAD7 TOTAL SCORE: 2

## 2018-02-08 ASSESSMENT — PATIENT HEALTH QUESTIONNAIRE - PHQ9: SUM OF ALL RESPONSES TO PHQ QUESTIONS 1-9: 6

## 2018-02-09 ENCOUNTER — THERAPY VISIT (OUTPATIENT)
Dept: PHYSICAL THERAPY | Facility: CLINIC | Age: 43
End: 2018-02-09
Payer: COMMERCIAL

## 2018-02-09 DIAGNOSIS — M54.50 BILATERAL LOW BACK PAIN WITHOUT SCIATICA: ICD-10-CM

## 2018-02-09 DIAGNOSIS — R10.30 GROIN PAIN: Primary | ICD-10-CM

## 2018-02-09 PROCEDURE — 97161 PT EVAL LOW COMPLEX 20 MIN: CPT | Mod: GP | Performed by: PHYSICAL THERAPIST

## 2018-02-09 PROCEDURE — 97110 THERAPEUTIC EXERCISES: CPT | Mod: GP | Performed by: PHYSICAL THERAPIST

## 2018-02-09 NOTE — PROGRESS NOTES
[facility] Initial Evaluation  Subjective:  Patient is a 42 year old male presenting with rehab back hpi.   Vlad Burgess is a 42 year old male with a lumbar (left) condition.  Occurance: groin - missed the last run of the ladder, insidous onset of back pain recently (h/o 2 back pain episodes in his history)    This is a new condition  September 2017 (groin injury)  .    Site of Pain: Central low back pain, left groin pain.  Radiates to: low back pain radiates around anteriorly to nthe navel and into the testicles, groin pain starts near the pelvis and migrates down the leg.  Pain is described as aching and sharp and is intermittent and reported as 2/10.   Pain is the same all the time.  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  Since onset symptoms are unchanged.    Previous treatment: 800 mg IBU.  Improvement with previous treatment: helps the back but not the leg.  General health as reported by patient is fair.  Pertinent medical history includes:  Overweight, depression and high blood pressure.  Medical allergies: amoxicillin.    Current medications:  Pain medication and anti-inflammatory.    Patient is working in normal job without restrictions.  Primary job tasks include:  Prolonged sitting, prolonged standing, lifting, repetitive tasks and driving.                                Objective:        Flexibility/Screens:   Positive screens:  Lumbar                   Lumbar/SI Evaluation  ROM:    AROM Lumbar:   Flexion:            50%  Ext:                    <10% - painful   Side Bend:        Left:  75%    Right:  75%  Rotation:           Left:  75%    Right:  75%  Side Glide:        Left:     Right:         Strength: Core Strength = 3/5 , cugin required for TA activation  Lumbar Myotomes:   normal            Lumbar DTR's:  normal        Lumbar Dermtomes:  not assessed                Neural Tension/Mobility:  Lumbar:  Normal        Lumbar Palpation:  normal        Lumbar Provocation:    Left positive with:  Stork w/ext and PROM hip  Right positive with: Stork w/ext  Right negative with:  PROM hip    SI joint/Sacrum:      Provocation:  Negative                                          Hip Evaluation  Hip PROM:  Hip PROM:  Left Hip:    Normal  Right Hip:  Normal                          Hip Strength:    Flexion:   Left: 3-/5   +++  Pain:                      Extension:  Left: 3/5  ++  Pain:  Abduction:  Left: 3-/5    ++   Pain:      External Rotation:  Left: 3/5   ++  Pain:                Hip Palpation:    Left hip tenderness present at:   Adductors  Left hip tenderness not present at:  Abductors               General     ROS    Assessment/Plan:    Patient is a 42 year old male with lumbar complaints.    Patient has the following significant findings with corresponding treatment plan.                Diagnosis 1:  Left Groin Pain   Pain -  hot/cold therapy, electric stimulation, self management, education and home program  Decreased ROM/flexibility - manual therapy and therapeutic exercise  Decreased strength - therapeutic exercise and therapeutic activities  Impaired muscle performance - neuro re-education  Decreased function - therapeutic activities  Impaired posture - neuro re-education  Diagnosis 2:  Low Back Pain  Pain -  hot/cold therapy, electric stimulation, self management, education and home program  Decreased ROM/flexibility - manual therapy and therapeutic exercise  Decreased strength - therapeutic exercise and therapeutic activities  Impaired muscle performance - neuro re-education  Decreased function - therapeutic activities  Impaired posture - neuro re-education    Therapy Evaluation Codes:   1) History comprised of:   Personal factors that impact the plan of care:      Profession.     Comorbidity factors that impact the plan of care are:      Depression, High blood pressure and Overweight.     Medications impacting care: Anti-inflammatory and Pain.  2) Examination of Body Systems comprised of:   Body structures and functions that impact the plan of care:      Lumbar spine.   Activity limitations that impact the plan of care are:      Bending, bathing, transfers, standing, sitting, lifting  3) Clinical presentation characteristics are:   Stable/Uncomplicated.  4) Decision-Making    Low complexity using standardized patient assessment instrument and/or measureable assessment of functional outcome.  Cumulative Therapy Evaluation is: Low complexity.    Previous and current functional limitations:  (See Goal Flow Sheet for this information)    Short term and Long term goals: (See Goal Flow Sheet for this information)     Communication ability:  Patient appears to be able to clearly communicate and understand verbal and written communication and follow directions correctly.  Treatment Explanation - The following has been discussed with the patient:   RX ordered/plan of care  Anticipated outcomes  Possible risks and side effects  This patient would benefit from PT intervention to resume normal activities.   Rehab potential is good.    Frequency:  1 X week, once daily  Duration:  for 6 weeks  Discharge Plan:  Achieve all LTG.  Independent in home treatment program.  Reach maximal therapeutic benefit.    Please refer to the daily flowsheet for treatment today, total treatment time and time spent performing 1:1 timed codes.

## 2018-02-09 NOTE — PROGRESS NOTES
[facility] Initial Evaluation  Subjective:  Patient is a 42 year old male presenting with rehab left ankle/foot hpi.                                      Pertinent medical history includes:  Overweight, depression and high blood pressure (Numbness/tingling).  Medical allergies: yes (Amox).    Current medications:  Anti-inflammatory.      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                                Objective:  System    Physical Exam    General     ROS    Assessment/Plan:

## 2018-02-09 NOTE — MR AVS SNAPSHOT
"              After Visit Summary   2/9/2018    Vlad Burgess    MRN: [id number]           Patient Information     Date Of Birth          1975        Visit Information        Provider Department      2/9/2018 7:30 AM Sean Bergeron PT [facility]        Today's Diagnoses     Groin pain    -  1    Bilateral low back pain without sciatica           Follow-ups after your visit        Your next 10 appointments already scheduled     Feb 27, 2018  4:00 PM CST   [facility] with Sean Bergeron PT   [facility] ([facility] [place])    [address]  [address]  [place] [address] [phone]              Who to contact     If you have questions or need follow up information about today's clinic visit or your schedule please contact [facility] directly at [phone].  Normal or non-critical lab and imaging results will be communicated to you by [employer], letter or phone within 4 business days after the clinic has received the results. If you do not hear from us within 7 days, please contact the clinic through [employer] or phone. If you have a critical or abnormal lab result, we will notify you by phone as soon as possible.  Submit refill requests through [email or web address] or call your pharmacy and they will forward the refill request to us. Please allow 3 business days for your refill to be completed.          Additional Information About Your Visit        [employer] Information     [email or web address] lets you send messages to your doctor, view your test results, renew your prescriptions, schedule appointments and more. To sign up, go to [email or web address] . Click on \"Log in\" on the left side of the screen, which will take you to the Welcome page. Then click on \"Sign up Now\" on the right side of the page.     You will be asked to enter the access code listed " below, as well as some personal information. Please follow the directions to create your username and password.     Your access code is: [email or web address]  Expires: 2018  2:40 PM     Your access code will  in 90 days. If you need help or a new code, please call your [place] clinic or [phone].        Care EveryWhere ID     This is your Care EveryWhere ID. This could be used by other organizations to access your [place] medical records  [id number]         Blood Pressure from Last 3 Encounters:   18 130/86   17 118/78   16 126/82    Weight from Last 3 Encounters:   18 (!) 141.1 kg (311 lb)   17 122.5 kg (270 lb)   16 135.2 kg (298 lb)              We Performed the Following     HC PT EVAL, LOW COMPLEXITY     [facility] INITIAL EVAL REPORT     THERAPEUTIC EXERCISES        Primary Care Provider Office Phone # Fax #    Rex Yee [phone]       [address] [place] [address]        Equal Access to Services     [facility]: Hadii aad ku hadasho Somaia, waaxda luqadaha, qaybta kaalmada aderaymon, lydia powell . So [facility] [phone].    ATENCIÓN: Si habla español, tiene a gonzalez disposición servicios gratuitos de asistencia lingüística. [facility] [phone].    We comply with applicable federal civil rights laws and [place] laws. We do not discriminate on the basis of race, color, national origin, age, disability, sex, sexual orientation, or gender identity.            Thank you!     Thank you for choosing [facility]  for your care. Our goal is always to provide you with excellent care. Hearing back from our patients is one way we can continue to improve our services. Please take a few minutes to complete the written survey that you may receive in the mail after your visit with us. Thank you!             Your Updated Medication List - Protect others around you: Learn  how to safely use, store and throw away your medicines at www.disposemymeds.org.      Notice  As of 2/9/2018  2:48 PM    You have not been prescribed any medications.

## 2018-04-26 ENCOUNTER — OFFICE VISIT (OUTPATIENT)
Dept: FAMILY MEDICINE | Facility: CLINIC | Age: 43
End: 2018-04-26
Payer: COMMERCIAL

## 2018-04-26 VITALS
HEART RATE: 93 BPM | TEMPERATURE: 98.7 F | BODY MASS INDEX: 44.81 KG/M2 | OXYGEN SATURATION: 96 % | HEIGHT: 70 IN | WEIGHT: 313 LBS | SYSTOLIC BLOOD PRESSURE: 138 MMHG | DIASTOLIC BLOOD PRESSURE: 92 MMHG

## 2018-04-26 DIAGNOSIS — H61.22 IMPACTED CERUMEN OF LEFT EAR: ICD-10-CM

## 2018-04-26 DIAGNOSIS — R07.89 TIGHTNESS IN CHEST: Primary | ICD-10-CM

## 2018-04-26 DIAGNOSIS — E88.810 METABOLIC SYNDROME X: ICD-10-CM

## 2018-04-26 DIAGNOSIS — R40.0 DAYTIME SOMNOLENCE: ICD-10-CM

## 2018-04-26 PROCEDURE — 93000 ELECTROCARDIOGRAM COMPLETE: CPT | Performed by: FAMILY MEDICINE

## 2018-04-26 PROCEDURE — 99214 OFFICE O/P EST MOD 30 MIN: CPT | Performed by: FAMILY MEDICINE

## 2018-04-26 NOTE — PROGRESS NOTES
"  SUBJECTIVE:   Vlad Burgess is a 42 year old male who presents to clinic today for the following health issues:      Ear Wax    {additional problems for provider to add:355941}    Problem list and histories reviewed & adjusted, as indicated.  Additional history: {NONE - AS DOCUMENTED:016187::\"as documented\"}    {HIST REVIEW/ LINKS 2:155339}    Reviewed and updated as needed this visit by clinical staff       Reviewed and updated as needed this visit by Provider         {PROVIDER CHARTING PREFERENCE:960963}  "

## 2018-04-26 NOTE — NURSING NOTE
"Chief Complaint   Patient presents with     Ear Problem     Ear wax build up       Initial BP (!) 138/92 (BP Location: Right arm, Patient Position: Sitting, Cuff Size: Adult Regular)  Pulse 93  Temp 98.7  F (37.1  C) (Oral)  Ht 5' 10\" (1.778 m)  Wt 313 lb (142 kg)  SpO2 96%  BMI 44.91 kg/m2 Estimated body mass index is 44.91 kg/(m^2) as calculated from the following:    Height as of this encounter: 5' 10\" (1.778 m).    Weight as of this encounter: 313 lb (142 kg).  Medication Reconciliation: complete   Mera Ellington MA    "

## 2018-04-26 NOTE — PROGRESS NOTES
"  SUBJECTIVE:   Vlad Burgess is a 42 year old male who presents to clinic today for the following health issues:      Ear wax build up in left ear its causing pt a lot of pressure and he has had some ringing in his ear as well, x 1 day ago he tried some home remedies to help clear the wax but nothing helped.     Pt has been having pain in chest that comes and goes, also feels like he has some heart palpitations, some days it worse than others and has some shortness of breath. Is not sure if its due to stress or anxiety at work, pt feels like he has something in the back of his throat.   Not food related.   Father with MI at age 68.   Patient with elevated sugars in the past and elevated cholesterol   No meds currently and last LDL was 91, HDL 38.    ROS:  General, neuro, sleep, psych, musculoskeletal system otherwise negative.  Patient has gained ~40 lbs in past year    BP (!) 138/92 (BP Location: Right arm, Patient Position: Sitting, Cuff Size: Adult Regular)  Pulse 93  Temp 98.7  F (37.1  C) (Oral)  Ht 5' 10\" (1.778 m)  Wt 313 lb (142 kg)  SpO2 96%  BMI 44.91 kg/m2    GENERAL: obese, alert and no distress  EYES: Eyes grossly normal to inspection, PERRL and conjunctivae and sclerae normal  HENT:  L ear blocked with wax  NECK: no adenopathy, no asymmetry, masses, or scars and thyroid normal to palpation  RESP: lungs clear to auscultation - no rales, rhonchi or wheezes  CV: regular rate and rhythm, normal S1 S2, no S3 or S4, no murmur, click or rub, no peripheral edema and peripheral pulses strong  MS: no gross musculoskeletal defects noted, no edema  SKIN: no suspicious lesions or rashes  NEURO: Normal strength and tone, mentation intact and speech normal  PSYCH: mentation appears normal, affect normal/bright    ASSESSMENT:  1. Tightness in chest  Discussed stress eval vs calcium score   Pt elects to have latter    Will check and patient needs to have multiple other things checked including SARAH, etc.   Come " back for follow up in 1-2 weeks to discuss with pcp   Moderate risk         EKG Interpretation:      Interpreted by Thad Ruggiero  Time reviewed:1335   Symptoms at time of EKG: None   Rhythm: Normal sinus   Rate: Normal  Axis: Normal  Ectopy: None  Conduction: Normal  ST Segments/ T Waves: No ST-T wave changes and No acute ischemic changes  Q Waves: None  Comparison to prior: No old EKG available    Clinical Impression: normal EKG      - EKG 12-lead complete w/read - Clinics    2. Impacted cerumen of left ear  Rinsed and improved   Prevention discussed    3. Daytime somnolence  Concern for SARAH    Home sleep eval ordered  - SLEEP HOME STUDY REFERRAL    4. Metabolic syndrome X  Will screen for early coronary disease    - CT Coronary Calcium Scan; Future

## 2018-05-02 ENCOUNTER — HOSPITAL ENCOUNTER (OUTPATIENT)
Dept: CARDIOLOGY | Facility: CLINIC | Age: 43
Discharge: HOME | End: 2018-05-02
Attending: FAMILY MEDICINE | Admitting: FAMILY MEDICINE
Payer: COMMERCIAL

## 2018-05-02 DIAGNOSIS — E88.810 METABOLIC SYNDROME X: ICD-10-CM

## 2018-05-02 PROCEDURE — 75571 CT HRT W/O DYE W/CA TEST: CPT | Mod: GA

## 2018-05-02 PROCEDURE — 75571 CT HRT W/O DYE W/CA TEST: CPT | Mod: 26 | Performed by: INTERNAL MEDICINE

## 2018-06-24 ENCOUNTER — TRANSFERRED RECORDS (OUTPATIENT)
Dept: HEALTH INFORMATION MANAGEMENT | Facility: CLINIC | Age: 43
End: 2018-06-24

## 2018-06-24 ENCOUNTER — NURSE TRIAGE (OUTPATIENT)
Dept: NURSING | Facility: CLINIC | Age: 43
End: 2018-06-24

## 2018-06-24 NOTE — TELEPHONE ENCOUNTER
"\"I had an insect bite and developed cellulitis about a year or two ago on my leg and I believe it's happening again\".  Caller is reporting that he thinks he got bit again by an insect and overnight the redness and pain have advanced quickly.     Reason for Disposition    [1] Red streak or red line AND [2] length > 2 inches (5 cm)    Additional Information    Negative: [1] Life-threatening reaction (anaphylaxis) in the past to same insect bite AND [2] < 2 hours since bite    Negative: Passed out (i.e., lost consciousness, collapsed and was not responding)    Negative: Difficulty breathing or wheezing    Negative: [1] Hoarseness or cough AND [2] sudden onset following bite    Negative: [1] Difficulty swallowing or slurred speech AND [2] sudden onset following bite    Negative: Sounds like a life-threatening emergency to the triager    Negative: Bee sting(s)    Negative: Spider bite(s)    Negative: Tick bite(s)    Negative: Mosquito bite(s)    Negative: Bed bug bite(s)    Negative: Boil suspected (i.e., painful red lump and NO insect bite)    Negative: Doesn't sound like an insect bite    Negative: Patient sounds very sick or weak to the triager    Negative: [1] SEVERE bite pain AND [2] not improved after 2 hours of pain medicine    Negative: [1] Fever AND [2] red area    Negative: [1] Fever AND [2] area is very tender to touch    Protocols used: INSECT BITE-ADULT-    Viv Thomas RN  [facility]      "

## 2019-02-01 ENCOUNTER — OFFICE VISIT (OUTPATIENT)
Dept: FAMILY MEDICINE | Facility: CLINIC | Age: 44
End: 2019-02-01
Payer: COMMERCIAL

## 2019-02-01 VITALS
SYSTOLIC BLOOD PRESSURE: 130 MMHG | HEART RATE: 85 BPM | HEIGHT: 70 IN | TEMPERATURE: 97.5 F | DIASTOLIC BLOOD PRESSURE: 76 MMHG | WEIGHT: 275 LBS | BODY MASS INDEX: 39.37 KG/M2 | OXYGEN SATURATION: 95 %

## 2019-02-01 DIAGNOSIS — L40.9 PSORIASIS: ICD-10-CM

## 2019-02-01 DIAGNOSIS — F41.9 ANXIETY: ICD-10-CM

## 2019-02-01 DIAGNOSIS — Z12.5 SCREENING FOR PROSTATE CANCER: ICD-10-CM

## 2019-02-01 DIAGNOSIS — F32.1 MAJOR DEPRESSIVE DISORDER, SINGLE EPISODE, MODERATE (H): ICD-10-CM

## 2019-02-01 DIAGNOSIS — Z51.81 MEDICATION MONITORING ENCOUNTER: ICD-10-CM

## 2019-02-01 DIAGNOSIS — Z00.00 ENCOUNTER FOR ROUTINE ADULT HEALTH EXAMINATION WITHOUT ABNORMAL FINDINGS: Primary | ICD-10-CM

## 2019-02-01 DIAGNOSIS — E66.01 MORBID OBESITY (H): ICD-10-CM

## 2019-02-01 DIAGNOSIS — Z12.11 SCREEN FOR COLON CANCER: ICD-10-CM

## 2019-02-01 DIAGNOSIS — E78.5 HYPERLIPIDEMIA LDL GOAL <100: ICD-10-CM

## 2019-02-01 DIAGNOSIS — R73.03 PREDIABETES: ICD-10-CM

## 2019-02-01 DIAGNOSIS — Z11.4 SCREENING FOR HIV (HUMAN IMMUNODEFICIENCY VIRUS): ICD-10-CM

## 2019-02-01 PROCEDURE — 99396 PREV VISIT EST AGE 40-64: CPT | Performed by: FAMILY MEDICINE

## 2019-02-01 ASSESSMENT — ANXIETY QUESTIONNAIRES
5. BEING SO RESTLESS THAT IT IS HARD TO SIT STILL: NOT AT ALL
1. FEELING NERVOUS, ANXIOUS, OR ON EDGE: MORE THAN HALF THE DAYS
GAD7 TOTAL SCORE: 6
7. FEELING AFRAID AS IF SOMETHING AWFUL MIGHT HAPPEN: NOT AT ALL
IF YOU CHECKED OFF ANY PROBLEMS ON THIS QUESTIONNAIRE, HOW DIFFICULT HAVE THESE PROBLEMS MADE IT FOR YOU TO DO YOUR WORK, TAKE CARE OF THINGS AT HOME, OR GET ALONG WITH OTHER PEOPLE: SOMEWHAT DIFFICULT
3. WORRYING TOO MUCH ABOUT DIFFERENT THINGS: MORE THAN HALF THE DAYS
6. BECOMING EASILY ANNOYED OR IRRITABLE: SEVERAL DAYS
2. NOT BEING ABLE TO STOP OR CONTROL WORRYING: SEVERAL DAYS

## 2019-02-01 ASSESSMENT — PATIENT HEALTH QUESTIONNAIRE - PHQ9
5. POOR APPETITE OR OVEREATING: NOT AT ALL
SUM OF ALL RESPONSES TO PHQ QUESTIONS 1-9: 6

## 2019-02-01 ASSESSMENT — MIFFLIN-ST. JEOR: SCORE: 2148.64

## 2019-02-01 NOTE — PROGRESS NOTES
SUBJECTIVE:   CC: Vlad Burgess is an 43 year old male who presents for preventative health visit.     Physical   Annual:     Getting at least 3 servings of Calcium per day:  Yes    Bi-annual eye exam:  Yes    Dental care twice a year:  Yes    Sleep apnea or symptoms of sleep apnea:  Daytime drowsiness    Diet:  Carbohydrate counting    Frequency of exercise:  2-3 days/week    Duration of exercise:  15-30 minutes    Taking medications regularly:  Yes    Medication side effects:  None    Additional concerns today:  Yes    PHQ-2 Total Score: 2    Prediabetes: Stable.    Glucose   Date Value Ref Range Status   02/27/2017 112 (H) 70 - 99 mg/dL Final   12/23/2016 123 (H) 70 - 99 mg/dL Final   02/16/2015 122 (H) 70 - 99 mg/dL Final     Comment:     Effective 7/30/2014, the reference range for this assay has changed to reflect   new instrumentation/methodology.     03/03/2007 104 60 - 110 mg/dL Final     Lab Results   Component Value Date    A1C 4.8 02/27/2017     Hemoglobin A1C (%)   Date Value   02/27/2017 4.8     LDL Cholesterol Calculated (mg/dL)   Date Value   02/27/2017 91   12/23/2016 104 (H)     Diabetes Management Resources    Depression/Anxiety: Patient had a PHQ9 score of 6 and a GAD7 score of 6 today. Patient's depression/anxiety is moderately controlled. Patient is not currently prescribed any medication for depression/anxiety management.     Psoriasis: Stable. Patient reports no acute issues.     Patient reports he has been drinking a ton of water lately which is causing him to wake up 4-5 times per night to urinate causing sleep issues. Patient reports he has been having some dry mouth.    Today's PHQ-2 Score:   PHQ-2 ( 1999 [employer]) 1/29/2019   Q1: Little interest or pleasure in doing things 1   Q2: Feeling down, depressed or hopeless 1   PHQ-2 Score 2   Q1: Little interest or pleasure in doing things Several days   Q2: Feeling down, depressed or hopeless Several days   PHQ-2 Score 2       Abuse: Current or  Past(Physical, Sexual or Emotional)- No  Do you feel safe in your environment? Yes    Social History     Tobacco Use     Smoking status: Former Smoker     Packs/day: 0.50     Years: 15.00     Pack years: 7.50     Types: Cigarettes     Last attempt to quit: 10/14/2007     Years since quittin.3     Smokeless tobacco: Never Used   Substance Use Topics     Alcohol use: Yes     Alcohol/week: 0.0 - 0.6 oz     Comment: 2-3 drinks per week avg     Alcohol Use 2019   If you drink alcohol do you typically have greater than 3 drinks per day OR greater than 7 drinks per week? No       Last PSA:   PSA   Date Value Ref Range Status   2016 0.53 0 - 4 ug/L Final     Comment:     Assay Method:  Chemiluminescence using Siemens Vista analyzer       Reviewed orders with patient. Reviewed health maintenance and updated orders accordingly - Yes  Labs reviewed in EPIC    Reviewed and updated as needed this visit by clinical staff  Tobacco  Allergies  Meds  Med Hx  Surg Hx  Fam Hx  Soc Hx      Reviewed and updated as needed this visit by Provider  Allergies        Health Maintenance     Colonoscopy:  At age 50   FIT:  Last 2016, Due              PSA:  Last 2016, Due   DEXA:  N/A    Health Maintenance Due   Topic Date Due     HIV SCREEN (SYSTEM ASSIGNED)  1993     PSA Q1 YR  2017     FIT Q1 YR  2017     LIPID MONITORING Q1 YEAR  2018     PHQ-9 Q6 MONTHS  2018     INFLUENZA VACCINE (1) 2018       Current Problem List    Patient Active Problem List   Diagnosis     Psoriasis     Major depressive disorder, single episode, moderate (H)     Leg length discrepancy     Anxiety     Prediabetes     Hyperlipidemia LDL goal <100     Groin pain     Bilateral low back pain without sciatica     Morbid obesity (H)       Past Medical History    Past Medical History:   Diagnosis Date     Alopecia areata 10/07    dr thomason     Anxiety      Hyperlipidemia LDL goal <100      Leg length  discrepancy      Major depressive disorder, single episode, moderate (H)      Morbid obesity (H)      Prediabetes      Psoriasis 2007       Past Surgical History    Past Surgical History:   Procedure Laterality Date     PE TUBES       SURGICAL HISTORY OF -       wisdom teeth       Current Medications    No current outpatient medications on file.       Allergies    Allergies   Allergen Reactions     Amoxicillin Hives     Penicillins Hives       Immunizations    Immunization History   Administered Date(s) Administered     HepB 1996, 1996     HepB-Adult 2018     Historical DTP/aP 1975, 1975, 1976, 1978     Influenza (H1N1) 2010     Influenza (IIV3) PF 10/27/2007, 11/15/2008, 2009, 10/02/2012     Influenza Vaccine, 3 YRS +, IM (QUADRIVALENT W/PRESERVATIVES) 10/01/2018     MMR 1977, 1990     OPV, trivalent, live 1975, 1975, 1976, 1978     TD (ADULT, 7+) 1988, 2010, 2016     TDAP Vaccine (Adacel) 2007     Varicella Pt Report Hx of Varicella/Chicken Pox 1984       Family History    Family History   Problem Relation Age of Onset     Diabetes Father      C.A.D. Father 72     Cancer Mother         lung cancer - age 45     C.A.D. Paternal Grandfather         age 40's     C.A.D. Maternal Grandmother      Cerebrovascular Disease Maternal Grandfather      Other - See Comments Sister          in MVA     Diabetes Brother        Social History    Social History     Socioeconomic History     Marital status:      Spouse name: Suzan     Number of children: 3     Years of education: 14     Highest education level: Not on file   Social Needs     Financial resource strain: Not on file     Food insecurity - worry: Not on file     Food insecurity - inability: Not on file     Transportation needs - medical: Not on file     Transportation needs - non-medical: Not on file   Occupational History     Employer:  "[employer]   Tobacco Use     Smoking status: Former Smoker     Packs/day: 0.50     Years: 15.00     Pack years: 7.50     Types: Cigarettes     Last attempt to quit: 10/14/2007     Years since quittin.3     Smokeless tobacco: Never Used   Substance and Sexual Activity     Alcohol use: Yes     Alcohol/week: 0.0 - 0.6 oz     Comment: 2-3 drinks per week avg     Drug use: No     Sexual activity: Yes     Partners: Female   Other Topics Concern      Service Not Asked     Blood Transfusions Not Asked     Caffeine Concern Yes     Comment: 1-2 cans qd     Occupational Exposure Not Asked     Hobby Hazards Not Asked     Sleep Concern Not Asked     Stress Concern Not Asked     Weight Concern Not Asked     Special Diet Not Asked     Back Care Not Asked     Exercise Yes     Comment: work physical     Bike Helmet Not Asked     Seat Belt Yes     Self-Exams Not Asked     Parent/sibling w/ CABG, MI or angioplasty before 65F 55M? No   Social History Narrative     Not on file       Review of Systems  Constitutional, HEENT, cardiovascular, pulmonary, GI, , musculoskeletal, neuro, skin, endocrine and psych systems are negative, except as otherwise noted.    OBJECTIVE:   /76   Pulse 85   Temp 97.5  F (36.4  C) (Oral)   Ht 1.778 m (5' 10\")   Wt 124.7 kg (275 lb)   SpO2 95%   BMI 39.46 kg/m      Physical Exam  GENERAL: healthy, alert and no distress  EYES: Eyes grossly normal to inspection  HENT:ear canals and TM's normal upon viewing with otoscope, nose and mouth without ulcers or lesions upon viewing with otoscope, Mild erythema of left TM  NECK: no adenopathy, no asymmetry, masses, or scars and thyroid normal to palpation  RESP: lungs clear to auscultation - no rales, rhonchi or wheezes  CV: regular rate and rhythm, normal S1 S2, no S3 or S4, no murmur, click or rub, no peripheral edema and peripheral pulses strong  ABDOMEN: soft, nontender, no hepatosplenomegaly, no masses and bowel sounds " normal   (male): normal male genitalia without lesions or urethral discharge, no hernia  RECTAL: normal sphincter tone, no rectal masses, prostate normal size, smooth, nontender without nodules or masses  MS: no gross musculoskeletal defects noted, no edema  SKIN: no suspicious lesions or rashes  NEURO: Normal strength and tone, mentation intact and speech normal  PSYCH: mentation appears normal, affect normal/bright  BACK: no CVA tenderness, no paralumbar tenderness    Diagnostic Test Results:  No results found for this or any previous visit (from the past 24 hour(s)).    ASSESSMENT/PLAN:       ICD-10-CM    1. Encounter for routine adult health examination without abnormal findings Z00.00 Comprehensive metabolic panel     Lipid panel reflex to direct LDL Fasting     CK total     CBC with platelets     TSH with free T4 reflex     UA reflex to Microscopic and Culture     Albumin Random Urine Quantitative with Creat Ratio     Prostate spec antigen screen     Fecal colorectal cancer screen FIT     Hemoglobin A1c     **HIV Antigen Antibody Combo FUTURE anytime   2. Major depressive disorder, single episode, moderate (H) F32.1 TSH with free T4 reflex   3. Anxiety F41.9 TSH with free T4 reflex   4. Prediabetes R73.03 Comprehensive metabolic panel     Lipid panel reflex to direct LDL Fasting     UA reflex to Microscopic and Culture     Albumin Random Urine Quantitative with Creat Ratio     Hemoglobin A1c   5. Hyperlipidemia LDL goal <100 E78.5 Comprehensive metabolic panel     Lipid panel reflex to direct LDL Fasting     CK total   6. Psoriasis L40.9    7. Morbid obesity (H) E66.01    8. Medication monitoring encounter Z51.81 Comprehensive metabolic panel     Lipid panel reflex to direct LDL Fasting     CK total     CBC with platelets     TSH with free T4 reflex     UA reflex to Microscopic and Culture     Albumin Random Urine Quantitative with Creat Ratio     Hemoglobin A1c   9. Screening for prostate cancer Z12.5  "Prostate spec antigen screen   10. Screen for colon cancer Z12.11 Fecal colorectal cancer screen FIT   11. Screening for HIV (human immunodeficiency virus) Z11.4 **HIV Antigen Antibody Combo FUTURE anytime     Discussed treatment/modality options, including risk and benefits, he desires advised 1 multivitamin per day, advised dentist every 6 months, advised diet and exercise and advised opthalmologist every 1-2 years. All diagnosis above reviewed and noted above, otherwise stable.  See [facility] orders for further details.      1) Patient had a PHQ9 score of 6 and a GAD7 score of 6 today. Patient's depression/anxiety is moderately controlled. Patient is not currently prescribed any medication for depression/anxiety management. Notes overall doing well.    2) Recommend Biotin Mouth wash for dry mouth.    3) Follow up in 1 week for fasting labs.    4) Follow up in 1 year for annual physical exam.    Health Maintenance Due   Topic Date Due     HIV SCREEN (SYSTEM ASSIGNED)  06/29/1993     PSA Q1 YR  12/23/2017     FIT Q1 YR  12/26/2017     LIPID MONITORING Q1 YEAR  02/27/2018     PHQ-9 Q6 MONTHS  08/07/2018     INFLUENZA VACCINE (1) 09/01/2018       COUNSELING:   Reviewed preventive health counseling, as reflected in patient instructions    BP Readings from Last 1 Encounters:   02/01/19 130/76     Estimated body mass index is 39.46 kg/m  as calculated from the following:    Height as of this encounter: 1.778 m (5' 10\").    Weight as of this encounter: 124.7 kg (275 lb).    Weight management plan: Discussed healthy diet and exercise guidelines     reports that he quit smoking about 11 years ago. His smoking use included cigarettes. He has a 7.50 pack-year smoking history. he has never used smokeless tobacco.    Counseling Resources:  ATP IV Guidelines  Pooled Cohorts Equation Calculator  FRAX Risk Assessment  ICSI Preventive Guidelines  Dietary Guidelines for Americans, 2010  USDA's MyPlate  ASA Prophylaxis  Lung CA " Screening    This document serves as a record of the services and decisions personally performed and made by Rex Yee MD. It was created on his behalf by Abdifatah Ayala, a trained medical scribe. The creation of this document is based on the provider's statements to the medical scribe.  Abdifatah Ayala February 1, 2019 3:48 PM     The information in this document, created by the medical scribe for me, accurately reflects the services I personally performed and the decisions made by me. I have reviewed and approved this document for accuracy prior to leaving the patient care area.  February 1, 2019          Rex Yee MD, [address]  [id number] [phone] Fax

## 2019-02-01 NOTE — PATIENT INSTRUCTIONS
Follow up in 1 week for fasting labs. Recommend Biotin mouth wash.     Google CDC Travel and look up immunizations you will need for the country you are traveling to.    [facility] [place]                        To reach your care team during and after hours:   [phone]  To reach our pharmacy:        [phone]    Clinic Hours                        Our clinic hours are:    Monday   7:30 am to 7:00 pm                  Tuesday through Friday 7:30 am to 5:00 pm                             Saturday   8:00 am to 12:00 pm      Sunday   Closed      Pharmacy Hours                        Our pharmacy hours are:    Monday   8:30 am to 7:00 pm       Tuesday to Friday  8:30 am to 6:00 pm                       Saturday    9:00 am to 1:00 pm              Sunday    Closed              There is also information available at our web site:  [email or web address]    If your provider ordered any lab tests and you do not receive the results within 10 business days, please call the clinic.    If you need a medication refill please contact your pharmacy.  Please allow 2-3 business days for your refill to be completed.    Our clinic offers telephone visits and e visits.  Please ask one of your team members to explain more.      Use [facility] (secure email communication and access to your chart) to send your primary care provider a message or make an appointment. Ask someone on your Team how to sign up for [facility].  Immunizations                      Immunization History   Administered Date(s) Administered     HepB 02/19/1996, 03/16/1996     Historical DTP/aP 1975, 1975, 01/02/1976, 03/24/1978     Influenza (H1N1) 01/06/2010     Influenza (IIV3) PF 10/27/2007, 11/15/2008, 11/21/2009, 10/02/2012     Influenza Vaccine, 3 YRS +, IM (QUADRIVALENT W/PRESERVATIVES) 10/01/2018     MMR 02/14/1977, 05/08/1990     OPV, trivalent, live 1975, 1975, 01/02/1976, 04/24/1978     TD (ADULT, 7+) 03/24/1988, 07/29/2010,  12/16/2016     TDAP Vaccine (Adacel) 01/25/2007     Varicella Pt Report Hx of Varicella/Chicken Pox 01/01/1984        Health Maintenance                         Health Maintenance Due   Topic Date Due     HIV SCREEN (SYSTEM ASSIGNED)  06/29/1993     Prostate Test (PSA) - yearly  12/23/2017     Colon Cancer Screening - FIT Test - yearly  12/26/2017     Cholesterol Lab - yearly  02/27/2018     Depression Assessment - every 6 months  08/07/2018     Flu Vaccine (1) 09/01/2018       Preventive Health Recommendations  Male Ages 40 to 49    Yearly exam:             See your health care provider every year in order to  o   Review health changes.   o   Discuss preventive care.    o   Review your medicines if your doctor has prescribed any.    You should be tested each year for STDs (sexually transmitted diseases) if you re at risk.     Have a cholesterol test every 5 years.     Have a colonoscopy (test for colon cancer) if someone in your family has had colon cancer or polyps before age 50.     After age 45, have a diabetes test (fasting glucose). If you are at risk for diabetes, you should have this test every 3 years.      Talk with your health care provider about whether or not a prostate cancer screening test (PSA) is right for you.    Shots: Get a flu shot each year. Get a tetanus shot every 10 years.     Nutrition:    Eat at least 5 servings of fruits and vegetables daily.     Eat whole-grain bread, whole-wheat pasta and brown rice instead of white grains and rice.     Get adequate Calcium and Vitamin D.     Lifestyle    Exercise for at least 150 minutes a week (30 minutes a day, 5 days a week). This will help you control your weight and prevent disease.     Limit alcohol to one drink per day.     No smoking.     Wear sunscreen to prevent skin cancer.     See your dentist every six months for an exam and cleaning.

## 2019-02-02 ASSESSMENT — ANXIETY QUESTIONNAIRES: GAD7 TOTAL SCORE: 6

## 2019-02-08 DIAGNOSIS — R73.03 PREDIABETES: ICD-10-CM

## 2019-02-08 DIAGNOSIS — F41.9 ANXIETY: ICD-10-CM

## 2019-02-08 DIAGNOSIS — Z51.81 MEDICATION MONITORING ENCOUNTER: ICD-10-CM

## 2019-02-08 DIAGNOSIS — F32.1 MAJOR DEPRESSIVE DISORDER, SINGLE EPISODE, MODERATE (H): ICD-10-CM

## 2019-02-08 DIAGNOSIS — E78.5 HYPERLIPIDEMIA LDL GOAL <100: ICD-10-CM

## 2019-02-08 DIAGNOSIS — Z12.5 SCREENING FOR PROSTATE CANCER: ICD-10-CM

## 2019-02-08 DIAGNOSIS — Z00.00 ENCOUNTER FOR ROUTINE ADULT HEALTH EXAMINATION WITHOUT ABNORMAL FINDINGS: ICD-10-CM

## 2019-02-08 DIAGNOSIS — Z11.4 SCREENING FOR HIV (HUMAN IMMUNODEFICIENCY VIRUS): ICD-10-CM

## 2019-02-08 LAB
ALBUMIN SERPL-MCNC: 3.7 G/DL (ref 3.4–5)
ALBUMIN UR-MCNC: NEGATIVE MG/DL
ALP SERPL-CCNC: 106 U/L (ref 40–150)
ALT SERPL W P-5'-P-CCNC: 41 U/L (ref 0–70)
ANION GAP SERPL CALCULATED.3IONS-SCNC: 4 MMOL/L (ref 3–14)
APPEARANCE UR: CLEAR
AST SERPL W P-5'-P-CCNC: 14 U/L (ref 0–45)
BILIRUB SERPL-MCNC: 0.7 MG/DL (ref 0.2–1.3)
BILIRUB UR QL STRIP: NEGATIVE
BUN SERPL-MCNC: 24 MG/DL (ref 7–30)
CALCIUM SERPL-MCNC: 8.7 MG/DL (ref 8.5–10.1)
CHLORIDE SERPL-SCNC: 104 MMOL/L (ref 94–109)
CHOLEST SERPL-MCNC: 152 MG/DL
CK SERPL-CCNC: 95 U/L (ref 30–300)
CO2 SERPL-SCNC: 27 MMOL/L (ref 20–32)
COLOR UR AUTO: YELLOW
CREAT SERPL-MCNC: 0.88 MG/DL (ref 0.66–1.25)
CREAT UR-MCNC: 161 MG/DL
ERYTHROCYTE [DISTWIDTH] IN BLOOD BY AUTOMATED COUNT: 12.7 % (ref 10–15)
GFR SERPL CREATININE-BSD FRML MDRD: >90 ML/MIN/{1.73_M2}
GLUCOSE SERPL-MCNC: 238 MG/DL (ref 70–99)
GLUCOSE UR STRIP-MCNC: >=1000 MG/DL
HBA1C MFR BLD: 10.6 % (ref 0–5.6)
HCT VFR BLD AUTO: 45.8 % (ref 40–53)
HDLC SERPL-MCNC: 31 MG/DL
HGB BLD-MCNC: 16.1 G/DL (ref 13.3–17.7)
HGB UR QL STRIP: NEGATIVE
KETONES UR STRIP-MCNC: NEGATIVE MG/DL
LDLC SERPL CALC-MCNC: 90 MG/DL
LEUKOCYTE ESTERASE UR QL STRIP: NEGATIVE
MCH RBC QN AUTO: 30.3 PG (ref 26.5–33)
MCHC RBC AUTO-ENTMCNC: 35.2 G/DL (ref 31.5–36.5)
MCV RBC AUTO: 86 FL (ref 78–100)
MICROALBUMIN UR-MCNC: 5 MG/L
MICROALBUMIN/CREAT UR: 3.3 MG/G CR (ref 0–17)
NITRATE UR QL: NEGATIVE
NONHDLC SERPL-MCNC: 121 MG/DL
PH UR STRIP: 5.5 PH (ref 5–7)
PLATELET # BLD AUTO: 134 10E9/L (ref 150–450)
POTASSIUM SERPL-SCNC: 4.5 MMOL/L (ref 3.4–5.3)
PROT SERPL-MCNC: 6.6 G/DL (ref 6.8–8.8)
PSA SERPL-ACNC: 0.47 UG/L (ref 0–4)
RBC # BLD AUTO: 5.32 10E12/L (ref 4.4–5.9)
SODIUM SERPL-SCNC: 135 MMOL/L (ref 133–144)
SOURCE: ABNORMAL
SP GR UR STRIP: >1.03 (ref 1–1.03)
TRIGL SERPL-MCNC: 154 MG/DL
TSH SERPL DL<=0.005 MIU/L-ACNC: 3.36 MU/L (ref 0.4–4)
UROBILINOGEN UR STRIP-ACNC: 0.2 EU/DL (ref 0.2–1)
WBC # BLD AUTO: 6.2 10E9/L (ref 4–11)

## 2019-02-08 PROCEDURE — 84443 ASSAY THYROID STIM HORMONE: CPT | Performed by: FAMILY MEDICINE

## 2019-02-08 PROCEDURE — G0103 PSA SCREENING: HCPCS | Performed by: FAMILY MEDICINE

## 2019-02-08 PROCEDURE — 87389 HIV-1 AG W/HIV-1&-2 AB AG IA: CPT | Performed by: FAMILY MEDICINE

## 2019-02-08 PROCEDURE — 82043 UR ALBUMIN QUANTITATIVE: CPT | Performed by: FAMILY MEDICINE

## 2019-02-08 PROCEDURE — 85027 COMPLETE CBC AUTOMATED: CPT | Performed by: FAMILY MEDICINE

## 2019-02-08 PROCEDURE — 83036 HEMOGLOBIN GLYCOSYLATED A1C: CPT | Performed by: FAMILY MEDICINE

## 2019-02-08 PROCEDURE — 80061 LIPID PANEL: CPT | Performed by: FAMILY MEDICINE

## 2019-02-08 PROCEDURE — 80053 COMPREHEN METABOLIC PANEL: CPT | Performed by: FAMILY MEDICINE

## 2019-02-08 PROCEDURE — 82550 ASSAY OF CK (CPK): CPT | Performed by: FAMILY MEDICINE

## 2019-02-08 PROCEDURE — 36415 COLL VENOUS BLD VENIPUNCTURE: CPT | Performed by: FAMILY MEDICINE

## 2019-02-08 PROCEDURE — 81003 URINALYSIS AUTO W/O SCOPE: CPT | Performed by: FAMILY MEDICINE

## 2019-02-09 ENCOUNTER — MYC MEDICAL ADVICE (OUTPATIENT)
Dept: FAMILY MEDICINE | Facility: CLINIC | Age: 44
End: 2019-02-09

## 2019-02-11 DIAGNOSIS — R73.09 ELEVATED HEMOGLOBIN A1C: Primary | ICD-10-CM

## 2019-02-11 LAB — HIV 1+2 AB+HIV1 P24 AG SERPL QL IA: NONREACTIVE

## 2019-02-11 NOTE — TELEPHONE ENCOUNTER
He is concerned about his A1c.  He is waiting to hear from Dr Yee about his lab results.  He bought a BS monitor and checked his BS after he had 2 breakfast burritos at  7:30 am.     12:30 pm      Routing to Dr Yee for lab results and next steps.    Aaliyah Shaikh RN- Triage [employer]

## 2019-02-11 NOTE — TELEPHONE ENCOUNTER
Routing to PCP for further review/recommendations/orders.  Please review 02/08/2019 lab results and advise    Merissa Parham RN  [facility]

## 2019-02-13 ENCOUNTER — OFFICE VISIT (OUTPATIENT)
Dept: FAMILY MEDICINE | Facility: CLINIC | Age: 44
End: 2019-02-13
Payer: COMMERCIAL

## 2019-02-13 VITALS
WEIGHT: 270 LBS | OXYGEN SATURATION: 93 % | SYSTOLIC BLOOD PRESSURE: 118 MMHG | TEMPERATURE: 97.6 F | HEART RATE: 80 BPM | HEIGHT: 70 IN | DIASTOLIC BLOOD PRESSURE: 76 MMHG | BODY MASS INDEX: 38.65 KG/M2

## 2019-02-13 DIAGNOSIS — E11.9 TYPE 2 DIABETES MELLITUS WITHOUT COMPLICATION, WITHOUT LONG-TERM CURRENT USE OF INSULIN (H): Primary | ICD-10-CM

## 2019-02-13 DIAGNOSIS — Z51.81 MEDICATION MONITORING ENCOUNTER: ICD-10-CM

## 2019-02-13 DIAGNOSIS — E66.01 MORBID OBESITY (H): ICD-10-CM

## 2019-02-13 DIAGNOSIS — E78.5 HYPERLIPIDEMIA LDL GOAL <70: ICD-10-CM

## 2019-02-13 PROCEDURE — 99214 OFFICE O/P EST MOD 30 MIN: CPT | Performed by: FAMILY MEDICINE

## 2019-02-13 RX ORDER — SODIUM PHOSPHATE,MONO-DIBASIC 19G-7G/118
1 ENEMA (ML) RECTAL DAILY
COMMUNITY

## 2019-02-13 ASSESSMENT — MIFFLIN-ST. JEOR: SCORE: 2125.96

## 2019-02-13 NOTE — PATIENT INSTRUCTIONS
Diabetes Goals:   1) Blood glucose  fasting.  2) Blood glucose less than 180 two hours after eating.    Bring Meter and strips when you meet with Diabetic educator.    [facility] [place]                        To reach your care team during and after hours:   [phone]  To reach our pharmacy:        [phone]    Clinic Hours                        Our clinic hours are:    Monday   7:30 am to 7:00 pm                  Tuesday through Friday 7:30 am to 5:00 pm                             Saturday   8:00 am to 12:00 pm      Sunday   Closed      Pharmacy Hours                        Our pharmacy hours are:    Monday   8:30 am to 7:00 pm       Tuesday to Friday  8:30 am to 6:00 pm                       Saturday    9:00 am to 1:00 pm              Sunday    Closed              There is also information available at our web site:  [email or web address]    If your provider ordered any lab tests and you do not receive the results within 10 business days, please call the clinic.    If you need a medication refill please contact your pharmacy.  Please allow 2-3 business days for your refill to be completed.    Our clinic offers telephone visits and e visits.  Please ask one of your team members to explain more.      Use [employer] (secure email communication and access to your chart) to send your primary care provider a message or make an appointment. Ask someone on your Team how to sign up for [facility].  Immunizations                      Immunization History   Administered Date(s) Administered     HepB 02/19/1996, 03/16/1996     HepB-Adult 08/18/2018     Historical DTP/aP 1975, 1975, 01/02/1976, 03/24/1978     Influenza (H1N1) 01/06/2010     Influenza (IIV3) PF 10/27/2007, 11/15/2008, 11/21/2009, 10/02/2012     Influenza Vaccine, 3 YRS +, IM (QUADRIVALENT W/PRESERVATIVES) 10/01/2018     MMR 02/14/1977, 05/08/1990     OPV, trivalent, live 1975, 1975, 01/02/1976, 04/24/1978     TD (ADULT,  7+) 03/24/1988, 07/29/2010, 12/16/2016     TDAP Vaccine (Adacel) 01/25/2007     Varicella Pt Report Hx of Varicella/Chicken Pox 01/01/1984        Health Maintenance                         Health Maintenance Due   Topic Date Due     Diabetic Foot Exam - yearly  06/29/1976     Eye Exam - yearly  06/29/1976     Colon Cancer Screening - FIT Test - yearly  12/26/2017

## 2019-02-13 NOTE — PROGRESS NOTES
SUBJECTIVE:   Vlad Burgess is a 43 year old male who presents to clinic today for the following health issues:    New Onset Diabetes: Patient's recent lab results showed elevated blood glucose and elevated A1C indicative of new onset Diabetes.     Glucose   Date Value Ref Range Status   02/08/2019 238 (H) 70 - 99 mg/dL Final     Comment:     Fasting specimen   02/27/2017 112 (H) 70 - 99 mg/dL Final   12/23/2016 123 (H) 70 - 99 mg/dL Final   02/16/2015 122 (H) 70 - 99 mg/dL Final     Comment:     Effective 7/30/2014, the reference range for this assay has changed to reflect   new instrumentation/methodology.     03/03/2007 104 60 - 110 mg/dL Final     Lab Results   Component Value Date    A1C 10.6 02/08/2019    A1C 4.8 02/27/2017     Hyperlipidemia: Patient's hyperlipidemia is moderately controlled. Patient is not currently prescribed any medication for hyperlipidemia management.    Recent Labs   Lab Test 02/08/19  0738 02/27/17  0822  02/16/15  1047   CHOL 152 152   < > 158   HDL 31* 38*   < > 39*   LDL 90 91   < > 76   TRIG 154* 114   < > 215*   CHOLHDLRATIO  --   --   --  4.1    < > = values in this interval not displayed.     BP Readings from Last 3 Encounters:   02/13/19 118/76   02/01/19 130/76   04/26/18 (!) 138/92     Lab Results   Component Value Date    CR 0.88 02/08/2019     Problem list and histories reviewed & adjusted, as indicated.  Additional history: as documented    body mass index is 38.74 kg/m .    Wt Readings from Last 4 Encounters:   02/13/19 122.5 kg (270 lb)   02/01/19 124.7 kg (275 lb)   04/26/18 142 kg (313 lb)   02/07/18 (!) 141.1 kg (311 lb)       Health Maintenance    Health Maintenance Due   Topic Date Due     FOOT EXAM Q1 YEAR  06/29/1976     EYE EXAM Q1 YEAR  06/29/1976     FIT Q1 YR  12/26/2017       Current Problem List    Patient Active Problem List   Diagnosis     Psoriasis     Major depressive disorder, single episode, moderate (H)     Leg length discrepancy     Anxiety      Groin pain     Bilateral low back pain without sciatica     Morbid obesity (H)     Type 2 diabetes, HbA1c goal < 7% (H)     Hyperlipidemia LDL goal <70       Past Medical History    Past Medical History:   Diagnosis Date     Alopecia areata 10/07    dr thomason     Anxiety      Hyperlipidemia LDL goal <70      Leg length discrepancy      Major depressive disorder, single episode, moderate (H)      Morbid obesity (H)      Prediabetes      Psoriasis 2007     Type 2 diabetes, HbA1c goal < 7% (H) 01/2019       Past Surgical History    Past Surgical History:   Procedure Laterality Date     PE TUBES  1983     SURGICAL HISTORY OF -   2002    wisdom teeth       Current Medications    Current Outpatient Medications   Medication Sig Dispense Refill     glucosamine-chondroitin 500-400 MG CAPS per capsule Take 1 capsule by mouth daily       metFORMIN (GLUCOPHAGE) 500 MG tablet Take 1 tablet (500 mg) by mouth 2 times daily (with meals) 180 tablet 3       Allergies    Allergies   Allergen Reactions     Amoxicillin Hives     Penicillins Hives       Immunizations    Immunization History   Administered Date(s) Administered     HepB 02/19/1996, 03/16/1996     HepB-Adult 08/18/2018     Historical DTP/aP 1975, 1975, 01/02/1976, 03/24/1978     Influenza (H1N1) 01/06/2010     Influenza (IIV3) PF 10/27/2007, 11/15/2008, 11/21/2009, 10/02/2012     Influenza Vaccine, 3 YRS +, IM (QUADRIVALENT W/PRESERVATIVES) 10/01/2018     MMR 02/14/1977, 05/08/1990     OPV, trivalent, live 1975, 1975, 01/02/1976, 04/24/1978     TD (ADULT, 7+) 03/24/1988, 07/29/2010, 12/16/2016     TDAP Vaccine (Adacel) 01/25/2007     Varicella Pt Report Hx of Varicella/Chicken Pox 01/01/1984       Family History    Family History   Problem Relation Age of Onset     Diabetes Father      C.A.D. Father 72     Cancer Mother         lung cancer - age 45     C.A.D. Paternal Grandfather         age 40's     C.A.D. Maternal Grandmother      Diabetes Maternal  Grandmother      Cerebrovascular Disease Maternal Grandfather      Other - See Comments Sister          in MVA     Diabetes Brother        Social History    Social History     Socioeconomic History     Marital status:      Spouse name: Suzan     Number of children: 3     Years of education: 14     Highest education level: Not on file   Social Needs     Financial resource strain: Not on file     Food insecurity - worry: Not on file     Food insecurity - inability: Not on file     Transportation needs - medical: Not on file     Transportation needs - non-medical: Not on file   Occupational History     Employer: [employer]   Tobacco Use     Smoking status: Former Smoker     Packs/day: 0.50     Years: 15.00     Pack years: 7.50     Types: Cigarettes     Last attempt to quit: 10/14/2007     Years since quittin.3     Smokeless tobacco: Never Used   Substance and Sexual Activity     Alcohol use: Yes     Alcohol/week: 0.0 - 0.6 oz     Comment: 2-3 drinks per month avg     Drug use: No     Sexual activity: Yes     Partners: Female   Other Topics Concern      Service Not Asked     Blood Transfusions Not Asked     Caffeine Concern Yes     Comment: 1-2 cans qd     Occupational Exposure Not Asked     Hobby Hazards Not Asked     Sleep Concern Not Asked     Stress Concern Not Asked     Weight Concern Not Asked     Special Diet Not Asked     Back Care Not Asked     Exercise Yes     Comment: work physical     Bike Helmet Not Asked     Seat Belt Yes     Self-Exams Not Asked     Parent/sibling w/ CABG, MI or angioplasty before 65F 55M? No   Social History Narrative     Not on file       All above reviewed and updated, all stable unless otherwise noted    Recent labs reviewed    ROS:  Constitutional, HEENT, cardiovascular, pulmonary, GI, , musculoskeletal, neuro, skin, endocrine and psych systems are negative, except as otherwise noted.    OBJECTIVE:                                                 "    /76   Pulse 80   Temp 97.6  F (36.4  C) (Oral)   Ht 1.778 m (5' 10\")   Wt 122.5 kg (270 lb)   SpO2 93%   BMI 38.74 kg/m    Body mass index is 38.74 kg/m .  GENERAL: healthy, alert and no distress  EYES: Eyes grossly normal to inspection  HENT:ear canals and TM's normal upon viewing with otoscope, nose and mouth without ulcers or lesions upon viewing with otoscope  NECK: no tenderness, no adenopathy, no asymmetry, no masses, no stiffness; thyroid- normal to palpation  RESP: lungs clear to auscultation - no rales, no rhonchi, no wheezes  CV: regular rates and rhythm, normal S1 S2, no S3 or S4 and no murmur, no click or rub -  ABDOMEN: soft, no tenderness, no  hepatosplenomegaly, no masses, normal bowel sounds, Umbilical hernia  MS: extremities- no gross deformities noted, no edema  SKIN: no suspicious lesions, no rashes  NEURO: strength and tone- normal, sensory exam- grossly normal, mentation- intact, speech- normal  BACK: no CVA tenderness, no paralumbar tenderness  PSYCH: Alert and oriented times 3; speech- coherent , normal rate and volume; able to articulate logical thoughts, able to abstract reason, no tangential thoughts, no hallucinations or delusions, affect- normal    DIAGNOSTICS/PROCEDURES:                                                      No results found for this or any previous visit (from the past 24 hour(s)).     ASSESSMENT/PLAN:                                                        ICD-10-CM    1. Type 2 diabetes mellitus without complication, without long-term current use of insulin (H) E11.9 DIABETES EDUCATOR REFERRAL     metFORMIN (GLUCOPHAGE) 500 MG tablet   2. Hyperlipidemia LDL goal <70 E78.5    3. Morbid obesity (H) E66.01    4. Medication monitoring encounter Z51.81      Discussed treatment/modality options, including risk and benefits, he desires advised 1 multivitamin per day, advised dentist every 6 months, advised diet and exercise and advised opthalmologist every 1-2 " years. All diagnosis above reviewed and noted above, otherwise stable.  See [facility] orders for further details.  Follow up as needed.    1) Patient's recent lab results showed a shift from prediabetes to Diabetes. Patient prescribed 500 mg Metformin BID today for diabetes management. Patient referred to Diabetic Educator today for further diabetes management..     2) Patient's hyperlipidemia is moderately controlled. Patient is not currently prescribed any medication for hyperlipidemia management. Statin considered, but not prescribed at this time due to recent addition of Metformin. Will be discussed next visit.    3) Follow up in 1 month for medication recheck visit.    Health Maintenance Due   Topic Date Due     FOOT EXAM Q1 YEAR  06/29/1976     EYE EXAM Q1 YEAR  06/29/1976     FIT Q1 YR  12/26/2017     This document serves as a record of the services and decisions personally performed and made by Rex Yee MD. It was created on his behalf by Abdifatah Ayala, a trained medical scribe. The creation of this document is based on the provider's statements to the medical scribe.  Abdifatah Ayala February 13, 2019 3:52 PM     The information in this document, created by the medical scribe for me, accurately reflects the services I personally performed and the decisions made by me. I have reviewed and approved this document for accuracy prior to leaving the patient care area.  February 13, 2019            Rex Yee MD [address]  [address] [phone] Fax

## 2019-03-07 ENCOUNTER — ALLIED HEALTH/NURSE VISIT (OUTPATIENT)
Dept: EDUCATION SERVICES | Facility: CLINIC | Age: 44
End: 2019-03-07
Payer: COMMERCIAL

## 2019-03-07 DIAGNOSIS — E11.9 TYPE 2 DIABETES MELLITUS WITHOUT COMPLICATION, WITHOUT LONG-TERM CURRENT USE OF INSULIN (H): Primary | ICD-10-CM

## 2019-03-07 PROCEDURE — G0108 DIAB MANAGE TRN  PER INDIV: HCPCS

## 2019-03-07 NOTE — PROGRESS NOTES
"Diabetes Self-Management Education & Support    Diabetes Education Self Management & Training    SUBJECTIVE/OBJECTIVE:  Presents for: Initial Assessment for new diagnosis  Accompanied by: Self  Diabetes education in the past 24mo: No  Focus of Visit: Healthy Eating  Diabetes type: Type 2  Cultural Influences/Ethnic Background:  American    Diabetes Symptoms & Complications    Patient Problem List and Family Medical History reviewed for relevant medical history, current medical status, and diabetes risk factors.    Vitals:  There were no vitals taken for this visit.  Estimated body mass index is 38.74 kg/m  as calculated from the following:    Height as of 2/13/19: 1.778 m (5' 10\").    Weight as of 2/13/19: 122.5 kg (270 lb).   Last 3 BP:   BP Readings from Last 3 Encounters:   02/13/19 118/76   02/01/19 130/76   04/26/18 (!) 138/92       History   Smoking Status     Former Smoker     Packs/day: 0.50     Years: 15.00     Types: Cigarettes     Quit date: 10/14/2007   Smokeless Tobacco     Never Used       Labs:  Lab Results   Component Value Date    A1C 10.6 02/08/2019     Lab Results   Component Value Date     02/08/2019     Lab Results   Component Value Date    LDL 90 02/08/2019     HDL Cholesterol   Date Value Ref Range Status   02/08/2019 31 (L) >39 mg/dL Final   ]  GFR Estimate   Date Value Ref Range Status   02/08/2019 >90 >60 mL/min/[1.73_m2] Final     Comment:     Non  GFR Calc  Starting 12/18/2018, serum creatinine based estimated GFR (eGFR) will be   calculated using the Chronic Kidney Disease Epidemiology Collaboration   (CKD-EPI) equation.       GFR Estimate If Black   Date Value Ref Range Status   02/08/2019 >90 >60 mL/min/[1.73_m2] Final     Comment:      GFR Calc  Starting 12/18/2018, serum creatinine based estimated GFR (eGFR) will be   calculated using the Chronic Kidney Disease Epidemiology Collaboration   (CKD-EPI) equation.       Lab Results   Component Value " Date    CR 0.88 2019     No results found for: MICROALBUMIN    Healthy Eating  Healthy Eating Assessed Today: Yes  Patient on a regular basis: Eats 3 meals a day  Meal planning: Avoiding sweets(low carb)  Meals include: Breakfast, Lunch, Dinner  Breakfast: some version of eggs, with veggies mixed in (celery, lemon, rupal juice)  Lunch: tuna packets  Dinner: salads OR chicken with roasted veggies  Snacks: not since diagnosis, maybe a cheese stick on occasion OR almonds  Beverages: Water  Has patient met with a dietitian in the past?: Yes    Being Active  Being Active Assessed Today: Yes  Exercise:: Yes  Days per week of moderate to strenuous exercise (like a brisk walk): 3  On average, minutes per day of exercise at this level: 40(10 laps around the track)  How intense was your typical exercise? : Heavy (like jogging or swimming  Exercise Minutes per Week: 120  Barrier to exercise: None    Monitoring  Monitoring Assessed Today: Yes  Blood Glucose Meter: (True Metrix)  Home Glucose (Sugar) Monitorin-2 times per day  Blood glucose trend: Decreasing rapidly  Low Glucose Range (mg/dL): 70-90  High Glucose Range (mg/dL): 140-180  Overall Range (mg/dL): 110-130    Last few weeks,     Taking Medications  Diabetes Medication(s)     Biguanides       metFORMIN (GLUCOPHAGE) 500 MG tablet    Take 1 tablet (500 mg) by mouth 2 times daily (with meals)          Taking Medication Assessed Today: Yes  Current Treatments: Oral Agent (monotherapy)  Problems taking diabetes medications regularly?: Yes  Diabetes medication side effects?: Yes  Treatment Compliance: All of the time    Problem Solving      Reducing Risks       Healthy Coping     Patient Activation Measure Survey Score:  No flowsheet data found.    ASSESSMENT:  Vlad is understandably frustrated to be diagnosed with diabetes, as he has lost 40# in the last year since being diagnosed with pre diabetes. He bought his own meter to start testing, and has started  to try to 'clean up' his diet. He is eating a bit low carb, cutting out bread and potatoes, and really trying to increase his lean protein and processed food intake. 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       Goals Addressed as of 3/7/2019 at 9:57 AM                 Today      Healthy Eating (pt-stated)       Added 3/7/19 by Bianka Pryor RD     My Goal: I will try to add in at least some carbohydrate to my meals     What I need to meet my goal: carb sources that you enjoy eating    I plan to meet my goal by this date: 3/30/19          Monitoring (pt-stated)   100%    Added 3/7/19 by Bianka Pryor RD     My Goal: I will check blood sugars at least once daily    What I need to meet my goal: meter    I plan to meet my goal by this date: 3/30/19            Patient's most recent   Lab Results   Component Value Date    A1C 10.6 02/08/2019    is not meeting goal of <7.0    INTERVENTION:   Diabetes knowledge and skills assessment:     Patient is knowledgeable in diabetes management concepts related to: Healthy Eating, Being Active, Monitoring and Taking Medication    Patient needs further education on the following diabetes management concepts: Problem Solving and Reducing Risks    Based on learning assessment above, most appropriate setting for further diabetes education would be: Group class or Individual setting.    Education provided today on:  AADE Self-Care Behaviors:  Diabetes Pathophysiology  Healthy Eating: carbohydrate counting, consistency in amount, composition, and timing of food intake and portion control  Being Active: relationship to blood glucose  Monitoring: individual blood glucose targets and frequency of monitoring  Taking Medication: action of prescribed medication, side effects of prescribed medications and when to take medications  Healthy Coping: recognize feelings about diagnosis    Opportunities for ongoing education and support in  diabetes-self management were discussed.    Pt verbalized understanding of concepts discussed and recommendations provided today.       Education Materials Provided:  Jos[facility] Understanding Diabetes Booklet    PLAN:  See Patient Instructions for co-developed, patient-stated behavior change goals.  AVS printed and provided to patient today. See Follow-Up section for recommended follow-up.    THALIA Braga CDE  Time Spent: 60 minutes  Encounter Type: Individual    Any diabetes medication dose changes were made via the CDE Protocol and Collaborative Practice Agreement with the patient's referring provider. A copy of this encounter was shared with the provider.

## 2019-03-07 NOTE — PATIENT INSTRUCTIONS
Goal blood sugars before meals 80-130mg/dL  2 hours after meals <180mg/dL  Check your number in the morning most days, and sometimes 2 hours after meals (rotate the meal)    I would recommend at least 2 carb servings (30 grams) per meal -- this is still considered a low carb diet, and allows a little more balance in your diet in the long run    Think 1/2 plate veggies, 1/4 plate protein, 1/4 starch

## 2019-03-20 ENCOUNTER — OFFICE VISIT (OUTPATIENT)
Dept: FAMILY MEDICINE | Facility: CLINIC | Age: 44
End: 2019-03-20
Payer: COMMERCIAL

## 2019-03-20 VITALS
WEIGHT: 271 LBS | HEIGHT: 70 IN | SYSTOLIC BLOOD PRESSURE: 126 MMHG | OXYGEN SATURATION: 93 % | HEART RATE: 83 BPM | BODY MASS INDEX: 38.8 KG/M2 | DIASTOLIC BLOOD PRESSURE: 80 MMHG | TEMPERATURE: 98.2 F

## 2019-03-20 DIAGNOSIS — F32.1 MAJOR DEPRESSIVE DISORDER, SINGLE EPISODE, MODERATE (H): ICD-10-CM

## 2019-03-20 DIAGNOSIS — F41.9 ANXIETY: ICD-10-CM

## 2019-03-20 DIAGNOSIS — E11.9 TYPE 2 DIABETES MELLITUS WITHOUT COMPLICATION, WITHOUT LONG-TERM CURRENT USE OF INSULIN (H): Primary | ICD-10-CM

## 2019-03-20 DIAGNOSIS — E66.01 MORBID OBESITY (H): ICD-10-CM

## 2019-03-20 DIAGNOSIS — E78.5 HYPERLIPIDEMIA LDL GOAL <70: ICD-10-CM

## 2019-03-20 DIAGNOSIS — Z51.81 MEDICATION MONITORING ENCOUNTER: ICD-10-CM

## 2019-03-20 PROCEDURE — 99214 OFFICE O/P EST MOD 30 MIN: CPT | Performed by: FAMILY MEDICINE

## 2019-03-20 ASSESSMENT — MIFFLIN-ST. JEOR: SCORE: 2130.5

## 2019-03-20 NOTE — PATIENT INSTRUCTIONS
[facility]                        To reach your care team during and after hours:   [phone]  To reach our pharmacy:        [phone]    Clinic Hours                        Our clinic hours are:    Monday   7:30 am to 7:00 pm                  Tuesday through Friday 7:30 am to 5:00 pm                             Saturday   8:00 am to 12:00 pm      Sunday   Closed      Pharmacy Hours                        Our pharmacy hours are:    Monday   8:30 am to 7:00 pm       Tuesday to Friday  8:30 am to 6:00 pm                       Saturday    9:00 am to 1:00 pm              Sunday    Closed              There is also information available at our web site:  [email or web address]    If your provider ordered any lab tests and you do not receive the results within 10 business days, please call the clinic.    If you need a medication refill please contact your pharmacy.  Please allow 2-3 business days for your refill to be completed.    Our clinic offers telephone visits and e visits.  Please ask one of your team members to explain more.      Use [employer] (secure email communication and access to your chart) to send your primary care provider a message or make an appointment. Ask someone on your Team how to sign up for [employer].  Immunizations                      Immunization History   Administered Date(s) Administered     HepB 02/19/1996, 03/16/1996     HepB-Adult 08/18/2018     Historical DTP/aP 1975, 1975, 01/02/1976, 03/24/1978     Influenza (H1N1) 01/06/2010     Influenza (IIV3) PF 10/27/2007, 11/15/2008, 11/21/2009, 10/02/2012     Influenza Vaccine, 3 YRS +, IM (QUADRIVALENT W/PRESERVATIVES) 10/01/2018     MMR 02/14/1977, 05/08/1990     OPV, trivalent, live 1975, 1975, 01/02/1976, 04/24/1978     TD (ADULT, 7+) 03/24/1988, 07/29/2010, 12/16/2016     TDAP Vaccine (Adacel) 01/25/2007     Varicella Pt Report Hx of Varicella/Chicken Pox 01/01/1984        Health Maintenance                          Health Maintenance Due   Topic Date Due     Diabetic Foot Exam - yearly  06/29/1976     Eye Exam - yearly  06/29/1976     Colon Cancer Screening - FIT Test - yearly  12/26/2017

## 2019-03-20 NOTE — PROGRESS NOTES
SUBJECTIVE:   Vlad Burgess is a 43 year old male who presents to clinic today for the following health issues:    Diabetes: Patient is checking blood glucose levels daily at home. Patient reports a blood glucose range of  mg/dL with a morning average of 116-117 mg/dL. Patient's Diabetes is reasonable controlled. Patient is currently prescribed metformin/diet for DM management. Has meet with Bianka VASQUEZ    Glucose   Date Value Ref Range Status   02/08/2019 238 (H) 70 - 99 mg/dL Final     Comment:     Fasting specimen   02/27/2017 112 (H) 70 - 99 mg/dL Final   12/23/2016 123 (H) 70 - 99 mg/dL Final   02/16/2015 122 (H) 70 - 99 mg/dL Final     Comment:     Effective 7/30/2014, the reference range for this assay has changed to reflect   new instrumentation/methodology.     03/03/2007 104 60 - 110 mg/dL Final     Lab Results   Component Value Date    A1C 10.6 02/08/2019    A1C 4.8 02/27/2017     Lipids:    Recent Labs   Lab Test 02/08/19  0738 02/27/17  0822  02/16/15  1047   CHOL 152 152   < > 158   HDL 31* 38*   < > 39*   LDL 90 91   < > 76   TRIG 154* 114   < > 215*   CHOLHDLRATIO  --   --   --  4.1    < > = values in this interval not displayed.     Blood Pressure:    BP Readings from Last 3 Encounters:   03/20/19 126/80   02/13/19 118/76   02/01/19 130/76     Depression/Anxiety:    No issues    Wt Readings from Last 4 Encounters:   03/20/19 122.9 kg (271 lb)   02/13/19 122.5 kg (270 lb)   02/01/19 124.7 kg (275 lb)   04/26/18 142 kg (313 lb)     2/13/2019 Office Note    New Onset Diabetes: Patient's recent lab results showed elevated blood glucose and elevated A1C indicative of new onset Diabetes.              Glucose   Date Value Ref Range Status   02/08/2019 238 (H) 70 - 99 mg/dL Final       Comment:       Fasting specimen   02/27/2017 112 (H) 70 - 99 mg/dL Final   12/23/2016 123 (H) 70 - 99 mg/dL Final   02/16/2015 122 (H) 70 - 99 mg/dL Final       Comment:       Effective 7/30/2014, the reference range  for this assay has changed to reflect   new instrumentation/methodology.      03/03/2007 104 60 - 110 mg/dL Final      Lab Results   Component Value Date     A1C 10.6 02/08/2019     A1C 4.8 02/27/2017      Hyperlipidemia: Patient's hyperlipidemia is moderately controlled. Patient is not currently prescribed any medication for hyperlipidemia management.            Recent Labs   Lab Test 02/08/19  0738 02/27/17  0822   02/16/15  1047   CHOL 152 152   < > 158   HDL 31* 38*   < > 39*   LDL 90 91   < > 76   TRIG 154* 114   < > 215*   CHOLHDLRATIO  --   --   --  4.1    < > = values in this interval not displayed.          BP Readings from Last 3 Encounters:   02/13/19 118/76   02/01/19 130/76   04/26/18 (!) 138/92            Lab Results   Component Value Date     CR 0.88 02/08/2019      Problem list and histories reviewed & adjusted, as indicated.  Additional history: as documented    body mass index is 38.88 kg/m .    Wt Readings from Last 4 Encounters:   03/20/19 122.9 kg (271 lb)   02/13/19 122.5 kg (270 lb)   02/01/19 124.7 kg (275 lb)   04/26/18 142 kg (313 lb)       Health Maintenance    Health Maintenance Due   Topic Date Due     FOOT EXAM Q1 YEAR  06/29/1976     EYE EXAM Q1 YEAR  06/29/1976     FIT Q1 YR  12/26/2017       Current Problem List    Patient Active Problem List   Diagnosis     Psoriasis     Major depressive disorder, single episode, moderate (H)     Leg length discrepancy     Anxiety     Groin pain     Bilateral low back pain without sciatica     Morbid obesity (H)     Type 2 diabetes, HbA1c goal < 7% (H)     Hyperlipidemia LDL goal <70       Past Medical History    Past Medical History:   Diagnosis Date     Alopecia areata 10/07    dr thomason     Anxiety      Hyperlipidemia LDL goal <70      Leg length discrepancy      Major depressive disorder, single episode, moderate (H)      Morbid obesity (H)      Prediabetes      Psoriasis 2007     Type 2 diabetes, HbA1c goal < 7% (H) 01/2019       Past Surgical  History    Past Surgical History:   Procedure Laterality Date     PE TUBES       SURGICAL HISTORY OF -       wisdom teeth       Current Medications    Current Outpatient Medications   Medication Sig Dispense Refill     blood glucose (NO BRAND SPECIFIED) test strip Test 1-2 times daily 200 strip 12     blood glucose monitoring (DARIELA MICROLET) lancets Use to test blood sugar 1-2 times daily or as directed. 100 each 1     glucosamine-chondroitin 500-400 MG CAPS per capsule Take 1 capsule by mouth daily       metFORMIN (GLUCOPHAGE) 500 MG tablet Take 1 tablet (500 mg) by mouth 2 times daily (with meals) 180 tablet 3     Multiple Vitamins-Minerals (MULTIVITAMIN ADULT PO)        STATIN NOT PRESCRIBED (INTENTIONAL) Please choose reason not prescribed, below         Allergies    Allergies   Allergen Reactions     Amoxicillin Hives     Penicillins Hives       Immunizations    Immunization History   Administered Date(s) Administered     HepB 1996, 1996     HepB-Adult 2018     Historical DTP/aP 1975, 1975, 1976, 1978     Influenza (H1N1) 2010     Influenza (IIV3) PF 10/27/2007, 11/15/2008, 2009, 10/02/2012     Influenza Vaccine, 3 YRS +, IM (QUADRIVALENT W/PRESERVATIVES) 10/01/2018     MMR 1977, 1990     OPV, trivalent, live 1975, 1975, 1976, 1978     TD (ADULT, 7+) 1988, 2010, 2016     TDAP Vaccine (Adacel) 2007     Varicella Pt Report Hx of Varicella/Chicken Pox 1984       Family History    Family History   Problem Relation Age of Onset     Diabetes Father      C.A.D. Father 72     Cancer Mother         lung cancer - age 45     C.A.D. Paternal Grandfather         age 40's     C.A.D. Maternal Grandmother      Diabetes Maternal Grandmother      Cerebrovascular Disease Maternal Grandfather      Other - See Comments Sister          in MVA     Diabetes Brother        Social History    Social History      Socioeconomic History     Marital status:      Spouse name: Suzan     Number of children: 3     Years of education: 14     Highest education level: Not on file   Occupational History     Employer: [employer]   Social Needs     Financial resource strain: Not on file     Food insecurity:     Worry: Not on file     Inability: Not on file     Transportation needs:     Medical: Not on file     Non-medical: Not on file   Tobacco Use     Smoking status: Former Smoker     Packs/day: 0.50     Years: 15.00     Pack years: 7.50     Types: Cigarettes     Last attempt to quit: 10/14/2007     Years since quittin.4     Smokeless tobacco: Never Used   Substance and Sexual Activity     Alcohol use: Yes     Alcohol/week: 0.0 - 0.6 oz     Comment: 2-3 drinks per month avg     Drug use: No     Sexual activity: Yes     Partners: Female   Lifestyle     Physical activity:     Days per week: Not on file     Minutes per session: Not on file     Stress: Not on file   Relationships     Social connections:     Talks on phone: Not on file     Gets together: Not on file     Attends [employer] service: Not on file     Active member of club or organization: Not on file     Attends meetings of clubs or organizations: Not on file     Relationship status: Not on file     Intimate partner violence:     Fear of current or ex partner: Not on file     Emotionally abused: Not on file     Physically abused: Not on file     Forced sexual activity: Not on file   Other Topics Concern      Service Not Asked     Blood Transfusions Not Asked     Caffeine Concern Yes     Comment: 1-2 cans qd     Occupational Exposure Not Asked     Hobby Hazards Not Asked     Sleep Concern Not Asked     Stress Concern Not Asked     Weight Concern Not Asked     Special Diet Not Asked     Back Care Not Asked     Exercise Yes     Comment: work physical     Bike Helmet Not Asked     Seat Belt Yes     Self-Exams Not Asked     Parent/sibling w/ CABG,  "MI or angioplasty before 65F 55M? No   Social History Narrative     Not on file       All above reviewed and updated, all stable unless otherwise noted    Recent labs reviewed    ROS:  Constitutional, HEENT, cardiovascular, pulmonary, GI, , musculoskeletal, neuro, skin, endocrine and psych systems are negative, except as otherwise noted.    OBJECTIVE:                                                    /80   Pulse 83   Temp 98.2  F (36.8  C) (Oral)   Ht 1.778 m (5' 10\")   Wt 122.9 kg (271 lb)   SpO2 93%   BMI 38.88 kg/m    Body mass index is 38.88 kg/m .  GENERAL: healthy, alert and no distress  EYES: Eyes grossly normal to inspection, extraocular movements - intact, and PERRL  HENT: ear canals- normal; TMs- normal; Nose- normal; Mouth- no ulcers, no lesions  NECK: no tenderness, no adenopathy, no asymmetry, no masses, no stiffness; thyroid- normal to palpation  RESP: lungs clear to auscultation - no rales, no rhonchi, no wheezes  CV: regular rates and rhythm, normal S1 S2, no S3 or S4 and no murmur, no click or rub -  ABDOMEN: soft, no tenderness, no  hepatosplenomegaly, no masses, normal bowel sounds  MS: extremities- no gross deformities noted, no edema  SKIN: no suspicious lesions, no rashes  NEURO: strength and tone- normal, sensory exam- grossly normal, mentation- intact, speech- normal, reflexes- symmetric  BACK: no CVA tenderness, no paralumbar tenderness  PSYCH: Alert and oriented times 3; speech- coherent , normal rate and volume; able to articulate logical thoughts, able to abstract reason, no tangential thoughts, no hallucinations or delusions, affect- normal    DIAGNOSTICS/PROCEDURES:                                                      No results found for this or any previous visit (from the past 24 hour(s)).     ASSESSMENT/PLAN:                                                        ICD-10-CM    1. Type 2 diabetes mellitus without complication, without long-term current use of insulin " (H) E11.9 STATIN NOT PRESCRIBED (INTENTIONAL)   2. Hyperlipidemia LDL goal <70 E78.5 STATIN NOT PRESCRIBED (INTENTIONAL)   3. Major depressive disorder, single episode, moderate (H) F32.1    4. Anxiety F41.9    5. Morbid obesity (H) E66.01    6. Medication monitoring encounter Z51.81        Discussed treatment/modality options, including risk and benefits, he desires:    Continue metformin  Consider statin, wishes to continue same first with diet and exercise, advised statin. (lipitor/crestor, low dose)  Consider ASA  Consider ACE/ARB.  Continue weight loss.  See fasting in after May 8th    All diagnosis above reviewed and noted above, otherwise stable.  See [facility] orders for further details.    Follow up approx 2 months    Health Maintenance Due   Topic Date Due     FOOT EXAM Q1 YEAR  06/29/1976     EYE EXAM Q1 YEAR  06/29/1976     FIT Q1 YR  12/26/2017     This document serves as a record of the services and decisions personally performed and made by Rex Yee MD. It was created on his behalf by Abdifatah Ayala, a trained medical scribe. The creation of this document is based on the provider's statements to the medical scribe.  Abdifatah Ayala March 20, 2019 12:42 PM     The information in this document, created by the medical scribe for me, accurately reflects the services I personally performed and the decisions made by me. I have reviewed and approved this document for accuracy prior to leaving the patient care area.  March 20, 2019            Rex Yee MD [address]  [phone] Fax

## 2019-05-14 ENCOUNTER — OFFICE VISIT (OUTPATIENT)
Dept: FAMILY MEDICINE | Facility: CLINIC | Age: 44
End: 2019-05-14
Payer: COMMERCIAL

## 2019-05-14 VITALS
WEIGHT: 283 LBS | TEMPERATURE: 97.8 F | OXYGEN SATURATION: 93 % | BODY MASS INDEX: 40.52 KG/M2 | SYSTOLIC BLOOD PRESSURE: 118 MMHG | DIASTOLIC BLOOD PRESSURE: 72 MMHG | HEIGHT: 70 IN | HEART RATE: 75 BPM

## 2019-05-14 DIAGNOSIS — B35.1 FUNGAL NAIL INFECTION: ICD-10-CM

## 2019-05-14 DIAGNOSIS — E78.5 HYPERLIPIDEMIA LDL GOAL <70: ICD-10-CM

## 2019-05-14 DIAGNOSIS — Z51.81 MEDICATION MONITORING ENCOUNTER: ICD-10-CM

## 2019-05-14 DIAGNOSIS — F32.1 MAJOR DEPRESSIVE DISORDER, SINGLE EPISODE, MODERATE (H): ICD-10-CM

## 2019-05-14 DIAGNOSIS — L40.9 PSORIASIS: ICD-10-CM

## 2019-05-14 DIAGNOSIS — B35.3 TINEA PEDIS OF BOTH FEET: ICD-10-CM

## 2019-05-14 DIAGNOSIS — H61.23 BILATERAL IMPACTED CERUMEN: ICD-10-CM

## 2019-05-14 DIAGNOSIS — E66.01 MORBID OBESITY (H): ICD-10-CM

## 2019-05-14 DIAGNOSIS — E11.9 TYPE 2 DIABETES MELLITUS WITHOUT COMPLICATION, WITHOUT LONG-TERM CURRENT USE OF INSULIN (H): Primary | ICD-10-CM

## 2019-05-14 DIAGNOSIS — F41.9 ANXIETY: ICD-10-CM

## 2019-05-14 DIAGNOSIS — H60.332 ACUTE SWIMMER'S EAR OF LEFT SIDE: ICD-10-CM

## 2019-05-14 LAB
ERYTHROCYTE [DISTWIDTH] IN BLOOD BY AUTOMATED COUNT: 12.9 % (ref 10–15)
HBA1C MFR BLD: 5.8 % (ref 0–5.6)
HCT VFR BLD AUTO: 48.6 % (ref 40–53)
HGB BLD-MCNC: 17.5 G/DL (ref 13.3–17.7)
MCH RBC QN AUTO: 31 PG (ref 26.5–33)
MCHC RBC AUTO-ENTMCNC: 36 G/DL (ref 31.5–36.5)
MCV RBC AUTO: 86 FL (ref 78–100)
PLATELET # BLD AUTO: 163 10E9/L (ref 150–450)
RBC # BLD AUTO: 5.65 10E12/L (ref 4.4–5.9)
WBC # BLD AUTO: 6.3 10E9/L (ref 4–11)

## 2019-05-14 PROCEDURE — 36415 COLL VENOUS BLD VENIPUNCTURE: CPT | Performed by: FAMILY MEDICINE

## 2019-05-14 PROCEDURE — 80053 COMPREHEN METABOLIC PANEL: CPT | Performed by: FAMILY MEDICINE

## 2019-05-14 PROCEDURE — 83036 HEMOGLOBIN GLYCOSYLATED A1C: CPT | Performed by: FAMILY MEDICINE

## 2019-05-14 PROCEDURE — 99214 OFFICE O/P EST MOD 30 MIN: CPT | Mod: 25 | Performed by: FAMILY MEDICINE

## 2019-05-14 PROCEDURE — 99207 C FOOT EXAM  NO CHARGE: CPT | Mod: 25 | Performed by: FAMILY MEDICINE

## 2019-05-14 PROCEDURE — 69209 REMOVE IMPACTED EAR WAX UNI: CPT | Mod: 50 | Performed by: FAMILY MEDICINE

## 2019-05-14 PROCEDURE — 82550 ASSAY OF CK (CPK): CPT | Performed by: FAMILY MEDICINE

## 2019-05-14 PROCEDURE — 80061 LIPID PANEL: CPT | Performed by: FAMILY MEDICINE

## 2019-05-14 PROCEDURE — 85027 COMPLETE CBC AUTOMATED: CPT | Performed by: FAMILY MEDICINE

## 2019-05-14 RX ORDER — ROSUVASTATIN CALCIUM 10 MG/1
10 TABLET, COATED ORAL DAILY
Qty: 90 TABLET | Refills: 3 | Status: SHIPPED | OUTPATIENT
Start: 2019-05-14 | End: 2019-10-16

## 2019-05-14 RX ORDER — NEOMYCIN SULFATE, POLYMYXIN B SULFATE, HYDROCORTISONE 3.5; 10000; 1 MG/ML; [USP'U]/ML; MG/ML
3 SOLUTION/ DROPS AURICULAR (OTIC) 4 TIMES DAILY
Qty: 10 ML | Refills: 1 | Status: SHIPPED | OUTPATIENT
Start: 2019-05-14 | End: 2019-06-05

## 2019-05-14 ASSESSMENT — MIFFLIN-ST. JEOR: SCORE: 2184.93

## 2019-05-14 ASSESSMENT — ANXIETY QUESTIONNAIRES
6. BECOMING EASILY ANNOYED OR IRRITABLE: NEARLY EVERY DAY
1. FEELING NERVOUS, ANXIOUS, OR ON EDGE: MORE THAN HALF THE DAYS
IF YOU CHECKED OFF ANY PROBLEMS ON THIS QUESTIONNAIRE, HOW DIFFICULT HAVE THESE PROBLEMS MADE IT FOR YOU TO DO YOUR WORK, TAKE CARE OF THINGS AT HOME, OR GET ALONG WITH OTHER PEOPLE: SOMEWHAT DIFFICULT
GAD7 TOTAL SCORE: 13
5. BEING SO RESTLESS THAT IT IS HARD TO SIT STILL: MORE THAN HALF THE DAYS
3. WORRYING TOO MUCH ABOUT DIFFERENT THINGS: MORE THAN HALF THE DAYS
7. FEELING AFRAID AS IF SOMETHING AWFUL MIGHT HAPPEN: SEVERAL DAYS
2. NOT BEING ABLE TO STOP OR CONTROL WORRYING: SEVERAL DAYS

## 2019-05-14 ASSESSMENT — PATIENT HEALTH QUESTIONNAIRE - PHQ9
5. POOR APPETITE OR OVEREATING: MORE THAN HALF THE DAYS
SUM OF ALL RESPONSES TO PHQ QUESTIONS 1-9: 12

## 2019-05-14 NOTE — PROGRESS NOTES
SUBJECTIVE:   Vlad Burgess is a 43 year old male who presents to clinic today for the following   health issues:    Diabetes Follow-up  Patient is checking blood glucose levels daily at home. Patient reports a blood glucose range of  mg/dL with a 90 day average of 135 mg/dL, and a morning average of 120 mg/dL. Patient's Diabetes is uncontrolled. Patient is currently prescribed 500 mg Metformin BID for DM management. Patient states that he has consistently taken 500 mg Metformin in the morning but has forgot to take the second 500 mg dose in the afternoon/night frequently.  Patient is checking blood sugars: once daily.  Results are as follows:         avg 100-150    Diabetic concerns: None     Symptoms of hypoglycemia (low blood sugar): none     Paresthesias (numbness or burning in feet) or sores: Yes sore on 2nd toe on left foot     Date of last diabetic eye exam: Nov 2018    Glucose   Date Value Ref Range Status   02/08/2019 238 (H) 70 - 99 mg/dL Final     Comment:     Fasting specimen   02/27/2017 112 (H) 70 - 99 mg/dL Final   12/23/2016 123 (H) 70 - 99 mg/dL Final   02/16/2015 122 (H) 70 - 99 mg/dL Final     Comment:     Effective 7/30/2014, the reference range for this assay has changed to reflect   new instrumentation/methodology.     03/03/2007 104 60 - 110 mg/dL Final     Lab Results   Component Value Date    A1C 10.6 02/08/2019    A1C 4.8 02/27/2017     Hemoglobin A1C (%)   Date Value   02/08/2019 10.6 (H)   02/27/2017 4.8     LDL Cholesterol Calculated (mg/dL)   Date Value   02/08/2019 90   02/27/2017 91     Diabetes Management Resources    Hyperlipidemia: Patient's hyperlipidemia is moderately controlled. Patient is not currently prescribed any medication for hyperlipidemia management.    Recent Labs   Lab Test 02/08/19  0738 02/27/17  0822  02/16/15  1047   CHOL 152 152   < > 158   HDL 31* 38*   < > 39*   LDL 90 91   < > 76   TRIG 154* 114   < > 215*   CHOLHDLRATIO  --   --   --  4.1    < > =  values in this interval not displayed.     Weight: Patient is up 11 pounds since 3/20/2019 but is down 30 pounds since 4/26/2018. Patient states he knows what he has to do but does not have the drive to do it right now, believes he lost his drive when his depression/anxiety medication was discontinued.     Wt Readings from Last 5 Encounters:   05/14/19 128.4 kg (283 lb)   03/20/19 122.9 kg (271 lb)   02/13/19 122.5 kg (270 lb)   02/01/19 124.7 kg (275 lb)   04/26/18 142 kg (313 lb)     Depression/Anxiety: Patient had a PHQ9 score of 12 and a GAD7 score of 13 today. Patient's depression/anxiety is minimally controlled. Patient is not currently prescribed any medication for depression/anxiety management.    Additional history: as documented    BP Readings from Last 3 Encounters:   05/14/19 118/72   03/20/19 126/80   02/13/19 118/76       body mass index is 40.61 kg/m .    Health Maintenance    Health Maintenance Due   Topic Date Due     FOOT EXAM Q1 YEAR  06/29/1976     FIT Q1 YR  12/26/2017     A1C Q3 MO  05/08/2019       Current Problem List    Patient Active Problem List   Diagnosis     Psoriasis     Major depressive disorder, single episode, moderate (H)     Leg length discrepancy     Anxiety     Groin pain     Bilateral low back pain without sciatica     Morbid obesity (H)     Type 2 diabetes, HbA1c goal < 7% (H)     Hyperlipidemia LDL goal <70       Past Medical History    Past Medical History:   Diagnosis Date     Alopecia areata 10/07    dr thomason     Anxiety      Hyperlipidemia LDL goal <70      Leg length discrepancy      Major depressive disorder, single episode, moderate (H)      Morbid obesity (H)      Prediabetes      Psoriasis 2007     Type 2 diabetes, HbA1c goal < 7% (H) 01/2019       Past Surgical History    Past Surgical History:   Procedure Laterality Date     PE TUBES  1983     SURGICAL HISTORY OF -   2002    wisdom teeth       Current Medications    Current Outpatient Medications   Medication Sig  Dispense Refill     blood glucose (NO BRAND SPECIFIED) test strip Test 1-2 times daily 200 strip 12     blood glucose monitoring (DARIELA MICROLET) lancets Use to test blood sugar 1-2 times daily or as directed. 100 each 1     glucosamine-chondroitin 500-400 MG CAPS per capsule Take 1 capsule by mouth daily       metFORMIN (GLUCOPHAGE) 500 MG tablet Take 1 tablet (500 mg) by mouth 2 times daily (with meals) 180 tablet 3     Multiple Vitamins-Minerals (MULTIVITAMIN ADULT PO)        neomycin-polymyxin-hydrocortisone (CORTISPORIN) 3.5-08065-1 otic solution Place 3 drops in ear(s) 4 times daily 10 mL 1     rosuvastatin (CRESTOR) 10 MG tablet Take 1 tablet (10 mg) by mouth daily 90 tablet 3     sertraline (ZOLOFT) 50 MG tablet Take 1 tablet (50 mg) by mouth daily 90 tablet 3       Allergies    Allergies   Allergen Reactions     Amoxicillin Hives     Penicillins Hives       Immunizations    Immunization History   Administered Date(s) Administered     HepB 1996, 1996     HepB-Adult 2018     Historical DTP/aP 1975, 1975, 1976, 1978     Influenza (H1N1) 2010     Influenza (IIV3) PF 10/27/2007, 11/15/2008, 2009, 10/02/2012     Influenza Vaccine, 3 YRS +, IM (QUADRIVALENT W/PRESERVATIVES) 10/01/2018     MMR 1977, 1990     OPV, trivalent, live 1975, 1975, 1976, 1978     TD (ADULT, 7+) 1988, 2010, 2016     TDAP Vaccine (Adacel) 2007     Varicella Pt Report Hx of Varicella/Chicken Pox 1984       Family History    Family History   Problem Relation Age of Onset     Diabetes Father      C.A.D. Father 72     Cancer Mother         lung cancer - age 45     C.A.D. Paternal Grandfather         age 40's     C.A.D. Maternal Grandmother      Diabetes Maternal Grandmother      Cerebrovascular Disease Maternal Grandfather      Other - See Comments Sister          in MVA     Diabetes Brother        Social History    Social  History     Socioeconomic History     Marital status:      Spouse name: Suzan     Number of children: 3     Years of education: 14     Highest education level: Not on file   Occupational History     Employer: [employer]   Social Needs     Financial resource strain: Not on file     Food insecurity:     Worry: Not on file     Inability: Not on file     Transportation needs:     Medical: Not on file     Non-medical: Not on file   Tobacco Use     Smoking status: Former Smoker     Packs/day: 0.50     Years: 15.00     Pack years: 7.50     Types: Cigarettes     Last attempt to quit: 10/14/2007     Years since quittin.5     Smokeless tobacco: Never Used   Substance and Sexual Activity     Alcohol use: Yes     Alcohol/week: 0.0 - 0.6 oz     Comment: 2-3 drinks per month avg     Drug use: No     Sexual activity: Yes     Partners: Female   Lifestyle     Physical activity:     Days per week: Not on file     Minutes per session: Not on file     Stress: Not on file   Relationships     Social connections:     Talks on phone: Not on file     Gets together: Not on file     Attends Yazidism service: Not on file     Active member of club or organization: Not on file     Attends meetings of clubs or organizations: Not on file     Relationship status: Not on file     Intimate partner violence:     Fear of current or ex partner: Not on file     Emotionally abused: Not on file     Physically abused: Not on file     Forced sexual activity: Not on file   Other Topics Concern      Service Not Asked     Blood Transfusions Not Asked     Caffeine Concern Yes     Comment: 1-2 cans qd     Occupational Exposure Not Asked     Hobby Hazards Not Asked     Sleep Concern Not Asked     Stress Concern Not Asked     Weight Concern Not Asked     Special Diet Not Asked     Back Care Not Asked     Exercise Yes     Comment: work physical     Bike Helmet Not Asked     Seat Belt Yes     Self-Exams Not Asked     Parent/sibling  "w/ CABG, MI or angioplasty before 65F 55M? No   Social History Narrative     Not on file       All above reviewed and updated, all stable unless otherwise noted    Recent labs reviewed    ROS:  Constitutional, HEENT, cardiovascular, pulmonary, GI, , musculoskeletal, neuro, skin, endocrine and psych systems are negative, except as otherwise noted.    OBJECTIVE:                                                    /72   Pulse 75   Temp 97.8  F (36.6  C) (Oral)   Ht 1.778 m (5' 10\")   Wt 128.4 kg (283 lb)   SpO2 93%   BMI 40.61 kg/m    Body mass index is 40.61 kg/m .  GENERAL: healthy, alert and no distress  EYES: Eyes grossly normal to inspection  HENT:ear canals and TM's normal upon viewing with otoscope, nose and mouth without ulcers or lesions upon viewing with otoscope, bilateral cerumen impaction, left greater than right otitis externa with bilateral erythema greater in left than right  NECK: no tenderness, no adenopathy, no asymmetry, no masses, no stiffness; thyroid- normal to palpation  RESP: lungs clear to auscultation - no rales, no rhonchi, no wheezes  CV: regular rates and rhythm, normal S1 S2, no S3 or S4 and no murmur, no click or rub -  ABDOMEN: soft, no tenderness, no  hepatosplenomegaly, no masses, normal bowel sounds  MS: extremities- no gross deformities noted, no edema  SKIN: no suspicious lesions, no rashes, fungal nails of bilateral feet, with erythema between toes  NEURO: strength and tone- normal, sensory exam- grossly normal, mentation- intact, speech- normal  BACK: no CVA tenderness, no paralumbar tenderness  PSYCH: Alert and oriented times 3; speech- coherent , normal rate and volume; able to articulate logical thoughts, able to abstract reason, no tangential thoughts, no hallucinations or delusions, affect- normal  Diabetic foot exam: normal DP and PT pulses, no trophic changes or ulcerative lesions and normal monofilament exam     DIAGNOSTICS/PROCEDURES:                         "                              No results found for this or any previous visit (from the past 24 hour(s)).     Labs Pending     ASSESSMENT/PLAN:                                                        ICD-10-CM    1. Type 2 diabetes mellitus without complication, without long-term current use of insulin (H) E11.9 Comprehensive metabolic panel     CBC with platelets     Lipid panel reflex to direct LDL Fasting     Hemoglobin A1c     BARIATRIC ADULT REFERRAL     rosuvastatin (CRESTOR) 10 MG tablet     metFORMIN (GLUCOPHAGE) 500 MG tablet     FOOT EXAM   2. Hyperlipidemia LDL goal <70 E78.5 Comprehensive metabolic panel     CBC with platelets     CK total     BARIATRIC ADULT REFERRAL     rosuvastatin (CRESTOR) 10 MG tablet   3. Psoriasis L40.9    4. Major depressive disorder, single episode, moderate (H) F32.1 sertraline (ZOLOFT) 50 MG tablet   5. Anxiety F41.9 sertraline (ZOLOFT) 50 MG tablet   6. Bilateral impacted cerumen H61.23 neomycin-polymyxin-hydrocortisone (CORTISPORIN) 3.5-84044-1 otic solution     REMOVE IMPACTED CERUMEN   7. Acute swimmer's ear of left side H60.332 neomycin-polymyxin-hydrocortisone (CORTISPORIN) 3.5-33661-0 otic solution   8. Tinea pedis of both feet B35.3    9. Fungal nail infection B35.1    10. Morbid obesity (H) E66.01 BARIATRIC ADULT REFERRAL   11. Medication monitoring encounter Z51.81 Comprehensive metabolic panel     CBC with platelets     Lipid panel reflex to direct LDL Fasting     CK total     Hemoglobin A1c     Discussed treatment/modality options, including risk and benefits, he desires advised 1 multivitamin per day, advised dentist every 6 months, advised diet and exercise and advised opthalmologist every 1-2 years. All diagnosis above reviewed and noted above, otherwise stable.  See [facility] orders for further details.  Follow up as needed.    1) Patient is checking blood glucose levels daily at home. Patient reports a blood glucose range of  mg/dL with a 90 day average of  135 mg/dL, and a morning average of 120 mg/dL. Patient's Diabetes is better controlled. Patient is currently prescribed 500 mg Metformin BID for DM management. Misses some evening doses recently. Patient's Metformin dosage increased from 500 mg BID to 1000 mg Metformin BID for further DM management. Weight loss    2) Patient's hyperlipidemia is moderately controlled. Patient is not currently prescribed any medication for hyperlipidemia management. Patient prescribed 10 mg Rosuvastatin daily for further hyperlipidemia management.     3) Patient had a PHQ9 score of 12 and a GAD7 score of 13 today. Patient's depression/anxiety is minimally controlled. Patient is not currently prescribed any medication for depression/anxiety management. Patient prescribed 50 mg Zoloft daily today for depression/anxiety management, as this worked well in the past.    4) Patient's ears flushed today.    5) Recommend Lamisil cream application to fungal nails and areas of tinea pedis between toes.     6) Follow up in 3 weeks for telephone medication recheck visit.     7) Patient referred to [facility] weight loss program today, and/or follow up with Bianka for weight management.     8) Patient prescribed Cortisporin drops today for otitis externa treatment.     9) Follow up in 6 weeks for fasting medication recheck visit.     Health Maintenance Due   Topic Date Due     FOOT EXAM Q1 YEAR  06/29/1976     FIT Q1 YR  12/26/2017     A1C Q3 MO  05/08/2019     This document serves as a record of the services and decisions personally performed and made by Rex Yee MD. It was created on his behalf by Abdifatah Ayala, a trained medical scribe. The creation of this document is based on the provider's statements to the medical scribe.  Abdifatah Ayala May 14, 2019 9:49 AM     The information in this document, created by the medical scribe for me, accurately reflects the services I personally performed and the decisions made by me. I have reviewed and approved this  document for accuracy prior to leaving the patient care area.  May 14, 2019            Rex Yee MD [address]  [id number] [phone] Fax

## 2019-05-14 NOTE — PATIENT INSTRUCTIONS
Diabetes Goals:   1) Blood glucose  fasting.  2) Blood glucose less than 180 two hours after eating.    Recommend Lamisil cream for fungal toenails and redness between toes.     [facility]                        To reach your care team during and after hours:   [phone]  To reach our pharmacy:        [phone]    Clinic Hours                        Our clinic hours are:    Monday   7:30 am to 7:00 pm                  Tuesday through Friday 7:30 am to 5:00 pm                             Saturday   8:00 am to 12:00 pm      Sunday   Closed      Pharmacy Hours                        Our pharmacy hours are:    Monday   8:30 am to 7:00 pm       Tuesday to Friday  8:30 am to 6:00 pm                       Saturday    9:00 am to 1:00 pm              Sunday    Closed              There is also information available at our web site:  [email or web address]    If your provider ordered any lab tests and you do not receive the results within 10 business days, please call the clinic.    If you need a medication refill please contact your pharmacy.  Please allow 2-3 business days for your refill to be completed.    Our clinic offers telephone visits and e visits.  Please ask one of your team members to explain more.      Use [employer] (secure email communication and access to your chart) to send your primary care provider a message or make an appointment. Ask someone on your Team how to sign up for [employer].  Immunizations                      Immunization History   Administered Date(s) Administered     HepB 02/19/1996, 03/16/1996     HepB-Adult 08/18/2018     Historical DTP/aP 1975, 1975, 01/02/1976, 03/24/1978     Influenza (H1N1) 01/06/2010     Influenza (IIV3) PF 10/27/2007, 11/15/2008, 11/21/2009, 10/02/2012     Influenza Vaccine, 3 YRS +, IM (QUADRIVALENT W/PRESERVATIVES) 10/01/2018     MMR 02/14/1977, 05/08/1990     OPV, trivalent, live 1975, 1975, 01/02/1976, 04/24/1978     TD  (ADULT, 7+) 03/24/1988, 07/29/2010, 12/16/2016     TDAP Vaccine (Adacel) 01/25/2007     Varicella Pt Report Hx of Varicella/Chicken Pox 01/01/1984        Health Maintenance                         Health Maintenance Due   Topic Date Due     Diabetic Foot Exam - yearly  06/29/1976     Eye Exam - yearly  06/29/1976     Colon Cancer Screening - FIT Test - yearly  12/26/2017     A1C (Diabetes) Lab - every 3 months  05/08/2019

## 2019-05-15 ENCOUNTER — TRANSFERRED RECORDS (OUTPATIENT)
Dept: MULTI SPECIALTY CLINIC | Facility: CLINIC | Age: 44
End: 2019-05-15

## 2019-05-15 LAB
ALBUMIN SERPL-MCNC: 3.9 G/DL (ref 3.4–5)
ALP SERPL-CCNC: 82 U/L (ref 40–150)
ALT SERPL W P-5'-P-CCNC: 43 U/L (ref 0–70)
ANION GAP SERPL CALCULATED.3IONS-SCNC: 4 MMOL/L (ref 3–14)
AST SERPL W P-5'-P-CCNC: 17 U/L (ref 0–45)
BILIRUB SERPL-MCNC: 0.7 MG/DL (ref 0.2–1.3)
BUN SERPL-MCNC: 19 MG/DL (ref 7–30)
CALCIUM SERPL-MCNC: 9.2 MG/DL (ref 8.5–10.1)
CHLORIDE SERPL-SCNC: 106 MMOL/L (ref 94–109)
CHOLEST SERPL-MCNC: 158 MG/DL
CK SERPL-CCNC: 124 U/L (ref 30–300)
CO2 SERPL-SCNC: 28 MMOL/L (ref 20–32)
CREAT SERPL-MCNC: 1.04 MG/DL (ref 0.66–1.25)
GFR SERPL CREATININE-BSD FRML MDRD: 87 ML/MIN/{1.73_M2}
GLUCOSE SERPL-MCNC: 123 MG/DL (ref 70–99)
HDLC SERPL-MCNC: 42 MG/DL
LDLC SERPL CALC-MCNC: 101 MG/DL
NONHDLC SERPL-MCNC: 116 MG/DL
POTASSIUM SERPL-SCNC: 4.5 MMOL/L (ref 3.4–5.3)
PROT SERPL-MCNC: 7.2 G/DL (ref 6.8–8.8)
RETINOPATHY: NORMAL
SODIUM SERPL-SCNC: 138 MMOL/L (ref 133–144)
TRIGL SERPL-MCNC: 75 MG/DL

## 2019-05-15 ASSESSMENT — ANXIETY QUESTIONNAIRES: GAD7 TOTAL SCORE: 13

## 2019-05-15 NOTE — RESULT ENCOUNTER NOTE
Dear Vlad,    Here is a summary of your recent test results:  -Cholesterol levels (LDL,HDL, Triglycerides) are normal.  ADVISE: rechecking in 1 year.   -Liver and gallbladder tests (ALT,AST, Alk phos,bilirubin) are normal.  -Kidney function (GFR) is normal.  -Sodium is normal.  -Potassium is normal.  -Calcium is normal.  -Glucose is elevated due to your diabetes.  -CK (muscle enzyme test) is normal.    For additional lab test information, [email or web address].org is an excellent reference.           Thank you very much for trusting me and [facility].     Healthy regards,  Jose Esquivel MD (I am covering for Rex Yee MD  who is away from the office today.)

## 2019-06-05 ENCOUNTER — VIRTUAL VISIT (OUTPATIENT)
Dept: FAMILY MEDICINE | Facility: CLINIC | Age: 44
End: 2019-06-05
Payer: COMMERCIAL

## 2019-06-05 DIAGNOSIS — E78.5 HYPERLIPIDEMIA LDL GOAL <70: ICD-10-CM

## 2019-06-05 DIAGNOSIS — E11.9 TYPE 2 DIABETES MELLITUS WITHOUT COMPLICATION, WITHOUT LONG-TERM CURRENT USE OF INSULIN (H): ICD-10-CM

## 2019-06-05 DIAGNOSIS — F32.1 MAJOR DEPRESSIVE DISORDER, SINGLE EPISODE, MODERATE (H): Primary | ICD-10-CM

## 2019-06-05 DIAGNOSIS — F41.9 ANXIETY: ICD-10-CM

## 2019-06-05 DIAGNOSIS — Z51.81 MEDICATION MONITORING ENCOUNTER: ICD-10-CM

## 2019-06-05 PROCEDURE — 99441 ZZC PHYSICIAN TELEPHONE EVALUATION 5-10 MIN: CPT | Performed by: FAMILY MEDICINE

## 2019-06-05 ASSESSMENT — ANXIETY QUESTIONNAIRES
7. FEELING AFRAID AS IF SOMETHING AWFUL MIGHT HAPPEN: NOT AT ALL
5. BEING SO RESTLESS THAT IT IS HARD TO SIT STILL: NEARLY EVERY DAY
1. FEELING NERVOUS, ANXIOUS, OR ON EDGE: SEVERAL DAYS
3. WORRYING TOO MUCH ABOUT DIFFERENT THINGS: SEVERAL DAYS
6. BECOMING EASILY ANNOYED OR IRRITABLE: SEVERAL DAYS
2. NOT BEING ABLE TO STOP OR CONTROL WORRYING: NOT AT ALL
IF YOU CHECKED OFF ANY PROBLEMS ON THIS QUESTIONNAIRE, HOW DIFFICULT HAVE THESE PROBLEMS MADE IT FOR YOU TO DO YOUR WORK, TAKE CARE OF THINGS AT HOME, OR GET ALONG WITH OTHER PEOPLE: SOMEWHAT DIFFICULT
GAD7 TOTAL SCORE: 6

## 2019-06-05 ASSESSMENT — PATIENT HEALTH QUESTIONNAIRE - PHQ9
5. POOR APPETITE OR OVEREATING: NOT AT ALL
SUM OF ALL RESPONSES TO PHQ QUESTIONS 1-9: 8

## 2019-06-05 NOTE — PROGRESS NOTES
TELEPHONE VISIT:                                                      Vlad Burgess is a 43 year old male who is being evaluated via a telephone visit.      S: The history as provided by the patient to the provider    PHQ-9 SCORE 2/1/2019 5/14/2019 6/5/2019   PHQ-9 Total Score - - -   PHQ-9 Total Score 6 12 8       LAWRENCE-7 SCORE 2/1/2019 5/14/2019 6/5/2019   Total Score - - -   Total Score 6 13 6     On zoloft 50 mg daily, no side effects, no issues, considering counseling, has not done yet, notes meds working well, had been on zoloft in the past    DM range 90 to 170, averaging 134    Lab Results   Component Value Date    A1C 5.8 05/14/2019    A1C 10.6 02/08/2019    A1C 4.8 02/27/2017     BP Readings from Last 3 Encounters:   05/14/19 118/72   03/20/19 126/80   02/13/19 118/76     Creatinine   Date Value Ref Range Status   05/14/2019 1.04 0.66 - 1.25 mg/dL Final     Recent Labs   Lab Test 05/14/19  1036 02/08/19  0738  02/16/15  1047   CHOL 158 152   < > 158   HDL 42 31*   < > 39*   * 90   < > 76   TRIG 75 154*   < > 215*   CHOLHDLRATIO  --   --   --  4.1    < > = values in this interval not displayed.     5/14    Diabetes Follow-up  Patient is checking blood glucose levels daily at home. Patient reports a blood glucose range of  mg/dL with a 90 day average of 135 mg/dL, and a morning average of 120 mg/dL. Patient's Diabetes is uncontrolled. Patient is currently prescribed 500 mg Metformin BID for DM management. Patient states that he has consistently taken 500 mg Metformin in the morning but has forgot to take the second 500 mg dose in the afternoon/night frequently.    Patient is checking blood sugars: once daily.  Results are as follows:         avg 100-150    Diabetic concerns: None     Symptoms of hypoglycemia (low blood sugar): none     Paresthesias (numbness or burning in feet) or sores: Yes sore on 2nd toe on left foot     Date of last diabetic eye exam: Nov 2018     Hyperlipidemia: Patient's  hyperlipidemia is moderately controlled. Patient is not currently prescribed any medication for hyperlipidemia management.            Recent Labs   Lab Test 02/08/19  0738 02/27/17  0822   02/16/15  1047   CHOL 152 152   < > 158   HDL 31* 38*   < > 39*   LDL 90 91   < > 76   TRIG 154* 114   < > 215*   CHOLHDLRATIO  --   --   --  4.1    < > = values in this interval not displayed.      Weight: Patient is up 11 pounds since 3/20/2019 but is down 30 pounds since 4/26/2018. Patient states he knows what he has to do but does not have the drive to do it right now, believes he lost his drive when his depression/anxiety medication was discontinued.          Wt Readings from Last 5 Encounters:   05/14/19 128.4 kg (283 lb)   03/20/19 122.9 kg (271 lb)   02/13/19 122.5 kg (270 lb)   02/01/19 124.7 kg (275 lb)   04/26/18 142 kg (313 lb)      Depression/Anxiety: Patient had a PHQ9 score of 12 and a GAD7 score of 13 today. Patient's depression/anxiety is minimally controlled. Patient is not currently prescribed any medication for depression/anxiety management    Pertinent parts of the the patient's medical history reviewed and confirmed by the provider included : see chart/snap shot.    ===================================================    I have reviewed the note as documented above.  This accurately captures the substance of my conversation with the patient      ASSESSMENT:                                                        ICD-10-CM    1. Major depressive disorder, single episode, moderate (H) F32.1 sertraline (ZOLOFT) 50 MG tablet   2. Anxiety F41.9 sertraline (ZOLOFT) 50 MG tablet   3. Type 2 diabetes mellitus without complication, without long-term current use of insulin (H) E11.9    4. Hyperlipidemia LDL goal <70 E78.5    5. Medication monitoring encounter Z51.81          PLAN:                                                      Discussed treatment/modality options, including risk and benefits, he  "desires:    Increase zoloft from 50 to 100 mg  Follow up in 3-4 weeks.  Continue current cares.  Hold on counseling for now.    All diagnosis above reviewed and noted above, otherwise stable.      See [facility] orders for further details.      Health Maintenance Due   Topic Date Due     FIT  12/26/2017     Total time of call between patient and provider was 5-10 minutes     CONSENT:                                                      \"We have found that certain health care needs can be provided without the need for a physical exam.  This service lets us provide the care you need with a short phone conversation.  If a prescription is necessary we can send it directly to your pharmacy.  If lab work is needed we can place an order for that and you can then stop by our lab to have the test done at a later time.    This telephone visit will be conducted via 3 way call with the you (the patient) , the physician/provider, and a me all on the line at the same time.  This allows your physician/provider to have the phone conversation with you while I will be taking notes for your medical record.  We will have full access to your [facility] medical record during this entire phone call.    Since this is like an office visit,  will bill your insurance company for this service.  Please check with your medical insurance if this type of telephone/virtual is covered . You may be responsible for the cost of this service if insurance coverage is denied.  The typical cost is $30 (10min), $59(11-20min) and $85 (21-30min)     If during the course of the call the physician/provider feels a telephone visit is not appropriate, you will not be charged for this service\"    Consent has been obtained for this service by care team member: yes.  See the scanned image in the medical record.      The patient has been notified of following (by SEYMOUR Nix Medical Assistant            "

## 2019-06-06 ASSESSMENT — ANXIETY QUESTIONNAIRES: GAD7 TOTAL SCORE: 6

## 2019-07-10 ENCOUNTER — OFFICE VISIT (OUTPATIENT)
Dept: FAMILY MEDICINE | Facility: CLINIC | Age: 44
End: 2019-07-10
Payer: COMMERCIAL

## 2019-07-10 VITALS
OXYGEN SATURATION: 95 % | HEIGHT: 70 IN | BODY MASS INDEX: 41.37 KG/M2 | SYSTOLIC BLOOD PRESSURE: 126 MMHG | DIASTOLIC BLOOD PRESSURE: 70 MMHG | HEART RATE: 88 BPM | TEMPERATURE: 98.7 F | WEIGHT: 289 LBS

## 2019-07-10 DIAGNOSIS — Z51.81 MEDICATION MONITORING ENCOUNTER: ICD-10-CM

## 2019-07-10 DIAGNOSIS — F41.9 ANXIETY: ICD-10-CM

## 2019-07-10 DIAGNOSIS — E11.9 TYPE 2 DIABETES MELLITUS WITHOUT COMPLICATION, WITHOUT LONG-TERM CURRENT USE OF INSULIN (H): Primary | ICD-10-CM

## 2019-07-10 DIAGNOSIS — E11.9 TYPE 2 DIABETES, HBA1C GOAL < 7% (H): ICD-10-CM

## 2019-07-10 DIAGNOSIS — E78.5 HYPERLIPIDEMIA LDL GOAL <70: ICD-10-CM

## 2019-07-10 DIAGNOSIS — F32.1 MAJOR DEPRESSIVE DISORDER, SINGLE EPISODE, MODERATE (H): ICD-10-CM

## 2019-07-10 DIAGNOSIS — E66.01 MORBID OBESITY (H): ICD-10-CM

## 2019-07-10 PROCEDURE — 99214 OFFICE O/P EST MOD 30 MIN: CPT | Performed by: FAMILY MEDICINE

## 2019-07-10 ASSESSMENT — MIFFLIN-ST. JEOR: SCORE: 2207.15

## 2019-07-10 NOTE — PATIENT INSTRUCTIONS
Diabetes Goals:   1) Blood glucose  fasting.  2) Blood glucose less than 180 two hours after eating.    [facility]                        To reach your care team during and after hours:   [phone]  To reach our pharmacy:        [phone]    Clinic Hours                        Our clinic hours are:    Monday   7:30 am to 7:00 pm                  Tuesday through Friday 7:30 am to 5:00 pm                             Saturday   8:00 am to 12:00 pm      Sunday   Closed      Pharmacy Hours                        Our pharmacy hours are:    Monday   8:30 am to 7:00 pm       Tuesday to Friday  8:30 am to 6:00 pm                       Saturday    9:00 am to 1:00 pm              Sunday    Closed              There is also information available at our web site:  [email or web address]    If your provider ordered any lab tests and you do not receive the results within 10 business days, please call the clinic.    If you need a medication refill please contact your pharmacy.  Please allow 2-3 business days for your refill to be completed.    Our clinic offers telephone visits and e visits.  Please ask one of your team members to explain more.      Use [employer] (secure email communication and access to your chart) to send your primary care provider a message or make an appointment. Ask someone on your Team how to sign up for [employer].  Immunizations                      Immunization History   Administered Date(s) Administered     HepB 02/19/1996, 03/16/1996     HepB-Adult 08/18/2018     Historical DTP/aP 1975, 1975, 01/02/1976, 03/24/1978     Influenza (H1N1) 01/06/2010     Influenza (IIV3) PF 10/27/2007, 11/15/2008, 11/21/2009, 10/02/2012     Influenza Vaccine, 3 YRS +, IM (QUADRIVALENT W/PRESERVATIVES) 10/01/2018     MMR 02/14/1977, 05/08/1990     OPV, trivalent, live 1975, 1975, 01/02/1976, 04/24/1978     TD (ADULT, 7+) 03/24/1988, 07/29/2010, 12/16/2016     TDAP Vaccine (Adacel)  01/25/2007     Varicella Pt Report Hx of Varicella/Chicken Pox 01/01/1984        Health Maintenance                         Health Maintenance Due   Topic Date Due     FIT Test  12/26/2017

## 2019-07-10 NOTE — PROGRESS NOTES
SUBJECTIVE:                                                    Vlad Burgess is a 44 year old male who presents to clinic today for the following health issues:    Previous labs reviewed with patient.     Diabetes Follow-up    Patient is checking blood glucose levels daily at home. Patient reports a blood glucose range of 110-250 mg/dL and morning average of 130-140 mg/dL. Patient's Diabetes is well controlled. Patient is currently prescribed 1000 mg Metformin BID for DM management. Patient reports he has only been taking 500 mg Metformin BID because he was unaware he was suppose to increase his dosage.     How often are you checking your blood sugar? One time daily    What time of day are you checking your blood sugars (select all that apply)?  After meals    Have you had any blood sugars above 200?  Yes     Have you had any blood sugars below 70?  No    What symptoms do you notice when your blood sugar is low?  None    What concerns do you have today about your diabetes? None     Do you have any of these symptoms? (Select all that apply)  Weight gain     Have you had a diabetic eye exam in the last 12 months? No    Glucose   Date Value Ref Range Status   05/14/2019 123 (H) 70 - 99 mg/dL Final   02/08/2019 238 (H) 70 - 99 mg/dL Final     Comment:     Fasting specimen   02/27/2017 112 (H) 70 - 99 mg/dL Final   12/23/2016 123 (H) 70 - 99 mg/dL Final   02/16/2015 122 (H) 70 - 99 mg/dL Final     Comment:     Effective 7/30/2014, the reference range for this assay has changed to reflect   new instrumentation/methodology.       Lab Results   Component Value Date    A1C 5.8 05/14/2019    A1C 10.6 02/08/2019    A1C 4.8 02/27/2017     Hemoglobin A1C (%)   Date Value   05/14/2019 5.8 (H)   02/08/2019 10.6 (H)     LDL Cholesterol Calculated (mg/dL)   Date Value   05/14/2019 101 (H)   02/08/2019 90     Diabetes Management Resources    Hyperlipidemia: Patient's hyperlipidemia is well controlled. Patient is currently  prescribed 10 mg Rosuvastatin daily for hyperlipidemia management.    Recent Labs   Lab Test 05/14/19  1036 02/08/19  0738  02/16/15  1047   CHOL 158 152   < > 158   HDL 42 31*   < > 39*   * 90   < > 76   TRIG 75 154*   < > 215*   CHOLHDLRATIO  --   --   --  4.1    < > = values in this interval not displayed.     Depression/Anxiety: Patient had a PHQ9 score of 5 and a GAD7 score of 5 today. Patient's depression/anxiety is well controlled. Patient is currently prescribed 100 mg Zoloft for depression/anxiety management. Patient reports he had some mild headaches when he increased his Zoloft but they have subsided.     Reviewed and updated as needed this visit by Provider       BP Readings from Last 3 Encounters:   07/10/19 126/70   05/14/19 118/72   03/20/19 126/80       body mass index is 41.47 kg/m .    Wt Readings from Last 4 Encounters:   07/10/19 131.1 kg (289 lb)   05/14/19 128.4 kg (283 lb)   03/20/19 122.9 kg (271 lb)   02/13/19 122.5 kg (270 lb)       Health Maintenance    Health Maintenance Due   Topic Date Due     FIT  12/26/2017       Current Problem List    Patient Active Problem List   Diagnosis     Psoriasis     Major depressive disorder, single episode, moderate (H)     Leg length discrepancy     Anxiety     Groin pain     Bilateral low back pain without sciatica     Morbid obesity (H)     Type 2 diabetes, HbA1c goal < 7% (H)     Hyperlipidemia LDL goal <70       Past Medical History    Past Medical History:   Diagnosis Date     Alopecia areata 10/07    dr thomason     Anxiety      Hyperlipidemia LDL goal <70      Leg length discrepancy      Major depressive disorder, single episode, moderate (H)      Morbid obesity (H)      Prediabetes      Psoriasis 2007     Type 2 diabetes, HbA1c goal < 7% (H) 01/2019       Past Surgical History    Past Surgical History:   Procedure Laterality Date     PE TUBES  1983     SURGICAL HISTORY OF -   2002    wisdom teeth       Current Medications    Current Outpatient  Medications   Medication Sig Dispense Refill     blood glucose (NO BRAND SPECIFIED) test strip Test 1-2 times daily 200 strip 12     blood glucose monitoring (DARIELA MICROLET) lancets Use to test blood sugar 1-2 times daily or as directed. 100 each 1     glucosamine-chondroitin 500-400 MG CAPS per capsule Take 1 capsule by mouth daily       metFORMIN (GLUCOPHAGE) 500 MG tablet Take 2 tablets (1,000 mg) by mouth 2 times daily (with meals) 360 tablet 3     Multiple Vitamins-Minerals (MULTIVITAMIN ADULT PO)        rosuvastatin (CRESTOR) 10 MG tablet Take 1 tablet (10 mg) by mouth daily 90 tablet 3     sertraline (ZOLOFT) 50 MG tablet Take 2 tablets (100 mg) by mouth daily 180 tablet 3       Allergies    Allergies   Allergen Reactions     Amoxicillin Hives     Penicillins Hives       Immunizations    Immunization History   Administered Date(s) Administered     HepB 1996, 1996     HepB-Adult 2018     Historical DTP/aP 1975, 1975, 1976, 1978     Influenza (H1N1) 2010     Influenza (IIV3) PF 10/27/2007, 11/15/2008, 2009, 10/02/2012     Influenza Vaccine, 3 YRS +, IM (QUADRIVALENT W/PRESERVATIVES) 10/01/2018     MMR 1977, 1990     OPV, trivalent, live 1975, 1975, 1976, 1978     TD (ADULT, 7+) 1988, 2010, 2016     TDAP Vaccine (Adacel) 2007     Varicella Pt Report Hx of Varicella/Chicken Pox 1984       Family History    Family History   Problem Relation Age of Onset     Diabetes Father      C.A.D. Father 72     Cancer Mother         lung cancer - age 45     C.A.D. Paternal Grandfather         age 40's     C.A.D. Maternal Grandmother      Diabetes Maternal Grandmother      Cerebrovascular Disease Maternal Grandfather      Other - See Comments Sister          in MVA     Diabetes Brother        Social History    Social History     Socioeconomic History     Marital status:      Spouse name: Suzan      Number of children: 3     Years of education: 14     Highest education level: Not on file   Occupational History     Employer: [employer]   Social Needs     Financial resource strain: Not on file     Food insecurity:     Worry: Not on file     Inability: Not on file     Transportation needs:     Medical: Not on file     Non-medical: Not on file   Tobacco Use     Smoking status: Former Smoker     Packs/day: 0.50     Years: 15.00     Pack years: 7.50     Types: Cigarettes     Last attempt to quit: 10/14/2007     Years since quittin.7     Smokeless tobacco: Never Used   Substance and Sexual Activity     Alcohol use: Yes     Alcohol/week: 0.0 - 0.6 oz     Comment: 2-3 drinks per month avg     Drug use: No     Sexual activity: Yes     Partners: Female   Lifestyle     Physical activity:     Days per week: Not on file     Minutes per session: Not on file     Stress: Not on file   Relationships     Social connections:     Talks on phone: Not on file     Gets together: Not on file     Attends Yazidi service: Not on file     Active member of club or organization: Not on file     Attends meetings of clubs or organizations: Not on file     Relationship status: Not on file     Intimate partner violence:     Fear of current or ex partner: Not on file     Emotionally abused: Not on file     Physically abused: Not on file     Forced sexual activity: Not on file   Other Topics Concern      Service Not Asked     Blood Transfusions Not Asked     Caffeine Concern Yes     Comment: 1-2 cans qd     Occupational Exposure Not Asked     Hobby Hazards Not Asked     Sleep Concern Not Asked     Stress Concern Not Asked     Weight Concern Not Asked     Special Diet Not Asked     Back Care Not Asked     Exercise Yes     Comment: work physical     Bike Helmet Not Asked     Seat Belt Yes     Self-Exams Not Asked     Parent/sibling w/ CABG, MI or angioplasty before 65F 55M? No   Social History Narrative     Not on file  "      All above reviewed and updated, all stable unless otherwise noted    Recent labs reviewed    ROS:  Constitutional, HEENT, cardiovascular, pulmonary, GI, , musculoskeletal, neuro, skin, endocrine and psych systems are negative, except as otherwise noted.    OBJECTIVE:                                                    /70   Pulse 88   Temp 98.7  F (37.1  C) (Oral)   Ht 1.778 m (5' 10\")   Wt 131.1 kg (289 lb)   SpO2 95%   BMI 41.47 kg/m    Body mass index is 41.47 kg/m .  GENERAL: healthy, alert and no distress  EYES: Eyes grossly normal to inspection, extraocular movements - intact, and PERRL  HENT: ear canals- normal; TMs- normal; Nose- normal; Mouth- no ulcers, no lesions  NECK: no tenderness, no adenopathy, no asymmetry, no masses, no stiffness; thyroid- normal to palpation  RESP: lungs clear to auscultation - no rales, no rhonchi, no wheezes  CV: regular rates and rhythm, normal S1 S2, no S3 or S4 and no murmur, no click or rub -  ABDOMEN: soft, no tenderness, no  hepatosplenomegaly, no masses, normal bowel sounds  MS: extremities- no gross deformities noted, no edema  SKIN: no suspicious lesions, no rashes  NEURO: strength and tone- normal, sensory exam- grossly normal, mentation- intact, speech- normal, reflexes- symmetric  BACK: no CVA tenderness, no paralumbar tenderness  PSYCH: Alert and oriented times 3; speech- coherent , normal rate and volume; able to articulate logical thoughts, able to abstract reason, no tangential thoughts, no hallucinations or delusions, affect- normal    DIAGNOSTICS/PROCEDURES:                                                      No results found for this or any previous visit (from the past 24 hour(s)).     ASSESSMENT:                                                        ICD-10-CM    1. Type 2 diabetes mellitus without complication, without long-term current use of insulin (H) E11.9 metFORMIN (GLUCOPHAGE) 500 MG tablet     Comprehensive metabolic panel     " Lipid panel reflex to direct LDL Fasting     UA reflex to Microscopic and Culture     Albumin Random Urine Quantitative with Creat Ratio     Hemoglobin A1c   2. Type 2 diabetes, HbA1c goal < 7% (H) E11.9 Comprehensive metabolic panel     Lipid panel reflex to direct LDL Fasting     UA reflex to Microscopic and Culture     Albumin Random Urine Quantitative with Creat Ratio     Hemoglobin A1c   3. Hyperlipidemia LDL goal <70 E78.5 Comprehensive metabolic panel     Lipid panel reflex to direct LDL Fasting     CK total   4. Major depressive disorder, single episode, moderate (H) F32.1    5. Anxiety F41.9    6. Morbid obesity (H) E66.01    7. Medication monitoring encounter Z51.81 Comprehensive metabolic panel     Lipid panel reflex to direct LDL Fasting     CK total     CBC with platelets     UA reflex to Microscopic and Culture     Albumin Random Urine Quantitative with Creat Ratio     Hemoglobin A1c       PLAN:                                                    Discussed treatment/modality options, including risk and benefits he desires:    advised 1 multivitamin per day, advised dentist every 6 months, advised diet and exercise and advised opthalmologist every 1-2 years.      1) Patient reports a blood glucose range of 110-250 mg/dL and morning average of 130-140 mg/dL. Patient's Diabetes is well controlled. Patient is currently prescribed 1000 mg Metformin BID for DM management Advised continued use.     2) Patient's hyperlipidemia is well controlled. Patient is currently prescribed 10 mg Rosuvastatin daily for hyperlipidemia management.    3) Patient had a PHQ9 score of 5 and a GAD7 score of 5 today. Patient's depression/anxiety is well controlled. Patient is currently prescribed 100 mg Zoloft for depression/anxiety management.    4) Recommend 1 pound of weight loss per week. Patient advised to consult with [facility] weight loss program.     5) Follow up in 1-2 months for fasting labs.     All diagnosis above  reviewed and noted above, otherwise stable.  See EpicCare orders for further details.     Return in about 6 weeks (around 8/21/2019), or if symptoms worsen or fail to improve, for Lab Work.    Health Maintenance Due   Topic Date Due     FIT  12/26/2017     This document serves as a record of the services and decisions personally performed and made by Rex Yee MD. It was created on his behalf by Abdifatah Ayala, a trained medical scribe. The creation of this document is based on the provider's statements to the medical scribe.  Abdifatah Ayala July 10, 2019 4:01 PM     The information in this document, created by the medical scribe for me, accurately reflects the services I personally performed and the decisions made by me. I have reviewed and approved this document for accuracy prior to leaving the patient care area.  July 10, 2019            Rex Yee MD [address]  [id number] [phone] Fax

## 2019-07-11 ASSESSMENT — PATIENT HEALTH QUESTIONNAIRE - PHQ9
5. POOR APPETITE OR OVEREATING: SEVERAL DAYS
SUM OF ALL RESPONSES TO PHQ QUESTIONS 1-9: 5

## 2019-07-11 ASSESSMENT — ANXIETY QUESTIONNAIRES
7. FEELING AFRAID AS IF SOMETHING AWFUL MIGHT HAPPEN: NOT AT ALL
2. NOT BEING ABLE TO STOP OR CONTROL WORRYING: NOT AT ALL
5. BEING SO RESTLESS THAT IT IS HARD TO SIT STILL: SEVERAL DAYS
3. WORRYING TOO MUCH ABOUT DIFFERENT THINGS: SEVERAL DAYS
6. BECOMING EASILY ANNOYED OR IRRITABLE: SEVERAL DAYS
IF YOU CHECKED OFF ANY PROBLEMS ON THIS QUESTIONNAIRE, HOW DIFFICULT HAVE THESE PROBLEMS MADE IT FOR YOU TO DO YOUR WORK, TAKE CARE OF THINGS AT HOME, OR GET ALONG WITH OTHER PEOPLE: SOMEWHAT DIFFICULT
1. FEELING NERVOUS, ANXIOUS, OR ON EDGE: SEVERAL DAYS
GAD7 TOTAL SCORE: 5

## 2019-07-12 ASSESSMENT — ANXIETY QUESTIONNAIRES: GAD7 TOTAL SCORE: 5

## 2019-08-28 DIAGNOSIS — Z51.81 MEDICATION MONITORING ENCOUNTER: ICD-10-CM

## 2019-08-28 DIAGNOSIS — E78.5 HYPERLIPIDEMIA LDL GOAL <70: ICD-10-CM

## 2019-08-28 DIAGNOSIS — E11.9 TYPE 2 DIABETES, HBA1C GOAL < 7% (H): ICD-10-CM

## 2019-08-28 DIAGNOSIS — E11.9 TYPE 2 DIABETES MELLITUS WITHOUT COMPLICATION, WITHOUT LONG-TERM CURRENT USE OF INSULIN (H): ICD-10-CM

## 2019-08-28 LAB
ALBUMIN SERPL-MCNC: 3.7 G/DL (ref 3.4–5)
ALBUMIN UR-MCNC: NEGATIVE MG/DL
ALP SERPL-CCNC: 80 U/L (ref 40–150)
ALT SERPL W P-5'-P-CCNC: 50 U/L (ref 0–70)
ANION GAP SERPL CALCULATED.3IONS-SCNC: 7 MMOL/L (ref 3–14)
APPEARANCE UR: CLEAR
AST SERPL W P-5'-P-CCNC: 18 U/L (ref 0–45)
BILIRUB SERPL-MCNC: 0.6 MG/DL (ref 0.2–1.3)
BILIRUB UR QL STRIP: NEGATIVE
BUN SERPL-MCNC: 25 MG/DL (ref 7–30)
CALCIUM SERPL-MCNC: 8.6 MG/DL (ref 8.5–10.1)
CHLORIDE SERPL-SCNC: 106 MMOL/L (ref 94–109)
CHOLEST SERPL-MCNC: 147 MG/DL
CK SERPL-CCNC: 182 U/L (ref 30–300)
CO2 SERPL-SCNC: 26 MMOL/L (ref 20–32)
COLOR UR AUTO: YELLOW
CREAT SERPL-MCNC: 0.86 MG/DL (ref 0.66–1.25)
CREAT UR-MCNC: 142 MG/DL
ERYTHROCYTE [DISTWIDTH] IN BLOOD BY AUTOMATED COUNT: 12.9 % (ref 10–15)
GFR SERPL CREATININE-BSD FRML MDRD: >90 ML/MIN/{1.73_M2}
GLUCOSE SERPL-MCNC: 144 MG/DL (ref 70–99)
GLUCOSE UR STRIP-MCNC: NEGATIVE MG/DL
HBA1C MFR BLD: 6 % (ref 0–5.6)
HCT VFR BLD AUTO: 45.9 % (ref 40–53)
HDLC SERPL-MCNC: 34 MG/DL
HGB BLD-MCNC: 16.1 G/DL (ref 13.3–17.7)
HGB UR QL STRIP: NEGATIVE
KETONES UR STRIP-MCNC: NEGATIVE MG/DL
LDLC SERPL CALC-MCNC: 73 MG/DL
LEUKOCYTE ESTERASE UR QL STRIP: NEGATIVE
MCH RBC QN AUTO: 30.6 PG (ref 26.5–33)
MCHC RBC AUTO-ENTMCNC: 35.1 G/DL (ref 31.5–36.5)
MCV RBC AUTO: 87 FL (ref 78–100)
MICROALBUMIN UR-MCNC: 5 MG/L
MICROALBUMIN/CREAT UR: 3.54 MG/G CR (ref 0–17)
NITRATE UR QL: NEGATIVE
NONHDLC SERPL-MCNC: 113 MG/DL
PH UR STRIP: 5.5 PH (ref 5–7)
PLATELET # BLD AUTO: 145 10E9/L (ref 150–450)
POTASSIUM SERPL-SCNC: 4.1 MMOL/L (ref 3.4–5.3)
PROT SERPL-MCNC: 6.7 G/DL (ref 6.8–8.8)
RBC # BLD AUTO: 5.26 10E12/L (ref 4.4–5.9)
SODIUM SERPL-SCNC: 139 MMOL/L (ref 133–144)
SOURCE: NORMAL
SP GR UR STRIP: 1.02 (ref 1–1.03)
TRIGL SERPL-MCNC: 199 MG/DL
UROBILINOGEN UR STRIP-ACNC: 0.2 EU/DL (ref 0.2–1)
WBC # BLD AUTO: 5.8 10E9/L (ref 4–11)

## 2019-08-28 PROCEDURE — 82043 UR ALBUMIN QUANTITATIVE: CPT | Performed by: FAMILY MEDICINE

## 2019-08-28 PROCEDURE — 80061 LIPID PANEL: CPT | Performed by: FAMILY MEDICINE

## 2019-08-28 PROCEDURE — 81003 URINALYSIS AUTO W/O SCOPE: CPT | Performed by: FAMILY MEDICINE

## 2019-08-28 PROCEDURE — 80053 COMPREHEN METABOLIC PANEL: CPT | Performed by: FAMILY MEDICINE

## 2019-08-28 PROCEDURE — 82550 ASSAY OF CK (CPK): CPT | Performed by: FAMILY MEDICINE

## 2019-08-28 PROCEDURE — 85027 COMPLETE CBC AUTOMATED: CPT | Performed by: FAMILY MEDICINE

## 2019-08-28 PROCEDURE — 36415 COLL VENOUS BLD VENIPUNCTURE: CPT | Performed by: FAMILY MEDICINE

## 2019-08-28 PROCEDURE — 83036 HEMOGLOBIN GLYCOSYLATED A1C: CPT | Performed by: FAMILY MEDICINE

## 2019-09-30 ENCOUNTER — HEALTH MAINTENANCE LETTER (OUTPATIENT)
Age: 44
End: 2019-09-30

## 2019-10-16 ENCOUNTER — MYC REFILL (OUTPATIENT)
Dept: FAMILY MEDICINE | Facility: CLINIC | Age: 44
End: 2019-10-16

## 2019-10-16 DIAGNOSIS — E11.9 TYPE 2 DIABETES MELLITUS WITHOUT COMPLICATION, WITHOUT LONG-TERM CURRENT USE OF INSULIN (H): ICD-10-CM

## 2019-10-16 DIAGNOSIS — E78.5 HYPERLIPIDEMIA LDL GOAL <70: ICD-10-CM

## 2019-10-16 RX ORDER — ROSUVASTATIN CALCIUM 10 MG/1
10 TABLET, COATED ORAL DAILY
Qty: 90 TABLET | Refills: 2 | Status: SHIPPED | OUTPATIENT
Start: 2019-10-16 | End: 2020-01-03

## 2019-10-16 NOTE — TELEPHONE ENCOUNTER
"Requested Prescriptions   Pending Prescriptions Disp Refills     rosuvastatin (CRESTOR) 10 MG tablet        Last Written Prescription Date:  5.14.19  Last Fill Quantity: 90 tablet,  # refills: 3   Last office visit: 7/10/2019 with prescribing provider:  Rex Yee MD             Future Office Visit:       90 tablet 3     Sig: Take 1 tablet (10 mg) by mouth daily       Statins Protocol Passed - 10/16/2019  2:51 PM        Passed - LDL on file in past 12 months     Recent Labs   Lab Test 08/28/19  0733   LDL 73             Passed - No abnormal creatine kinase in past 12 months     Recent Labs   Lab Test 08/28/19  0733                   Passed - Recent (12 mo) or future (30 days) visit within the authorizing provider's specialty     Patient has had an office visit with the authorizing provider or a provider within the authorizing providers department within the previous 12 mos or has a future within next 30 days. See \"Patient Info\" tab in inbasket, or \"Choose Columns\" in Meds & Orders section of the refill encounter.              Passed - Medication is active on med list        Passed - Patient is age 18 or older        "

## 2019-10-29 ENCOUNTER — HEALTH MAINTENANCE LETTER (OUTPATIENT)
Age: 44
End: 2019-10-29

## 2019-11-12 ENCOUNTER — MYC MEDICAL ADVICE (OUTPATIENT)
Dept: FAMILY MEDICINE | Facility: CLINIC | Age: 44
End: 2019-11-12

## 2019-11-12 ENCOUNTER — TELEPHONE (OUTPATIENT)
Dept: FAMILY MEDICINE | Facility: CLINIC | Age: 44
End: 2019-11-12

## 2019-11-12 NOTE — TELEPHONE ENCOUNTER
Pt is due now to update PHQ9.  [employer] message sent to pt. Follow up end date 1/13/20.   PHQ-9 SCORE 5/14/2019 6/5/2019 7/11/2019   PHQ-9 Total Score - - -   PHQ-9 Total Score 12 8 5     Matthew SANDY, CMA

## 2019-11-12 NOTE — LETTER
[facility] - [address] [address]                                                                                                       [phone]    November 20, 2019    Vlad Burgess  [address] [place] [id number]      To Whom it May Concern:    After reviewing your chart, you are due for an updated PHQ-9 and LAWRENCE-7, which are questionnaires regarding your mood over the last 2 weeks.     Please fill them out and send it back to me.     Thank you for your time.      Sincerely,        eRx Yee M.D.

## 2019-11-20 ASSESSMENT — ANXIETY QUESTIONNAIRES
1. FEELING NERVOUS, ANXIOUS, OR ON EDGE: SEVERAL DAYS
GAD7 TOTAL SCORE: 4
7. FEELING AFRAID AS IF SOMETHING AWFUL MIGHT HAPPEN: NOT AT ALL
5. BEING SO RESTLESS THAT IT IS HARD TO SIT STILL: NOT AT ALL
2. NOT BEING ABLE TO STOP OR CONTROL WORRYING: SEVERAL DAYS
3. WORRYING TOO MUCH ABOUT DIFFERENT THINGS: SEVERAL DAYS
GAD7 TOTAL SCORE: 4
6. BECOMING EASILY ANNOYED OR IRRITABLE: SEVERAL DAYS
4. TROUBLE RELAXING: NOT AT ALL
7. FEELING AFRAID AS IF SOMETHING AWFUL MIGHT HAPPEN: NOT AT ALL
GAD7 TOTAL SCORE: 4

## 2019-11-20 ASSESSMENT — PATIENT HEALTH QUESTIONNAIRE - PHQ9
SUM OF ALL RESPONSES TO PHQ QUESTIONS 1-9: 5
SUM OF ALL RESPONSES TO PHQ QUESTIONS 1-9: 5

## 2019-11-20 NOTE — TELEPHONE ENCOUNTER
PHQ-9 SCORE 6/5/2019 7/11/2019 11/20/2019   PHQ-9 Total Score - - -   PHQ-9 Total Score MyChart - - 5 (Mild depression)   PHQ-9 Total Score 8 5 5     LAWRENCE-7 SCORE 6/5/2019 7/11/2019 11/20/2019   Total Score - - -   Total Score - - 4 (minimal anxiety)   Total Score 6 5 4

## 2019-11-20 NOTE — TELEPHONE ENCOUNTER
Pt has not responded to [employer] message, please call pt and update phq 9.Matthew SANDY, CMA

## 2019-11-20 NOTE — TELEPHONE ENCOUNTER
Letter sent with PHQ9/GAD7 and self-addressed, stamped, return envelope    Merissa Parham RN  [facility]

## 2019-11-21 ASSESSMENT — ANXIETY QUESTIONNAIRES: GAD7 TOTAL SCORE: 4

## 2019-11-21 ASSESSMENT — PATIENT HEALTH QUESTIONNAIRE - PHQ9: SUM OF ALL RESPONSES TO PHQ QUESTIONS 1-9: 5

## 2019-12-15 ENCOUNTER — HEALTH MAINTENANCE LETTER (OUTPATIENT)
Age: 44
End: 2019-12-15

## 2020-01-03 ENCOUNTER — MYC REFILL (OUTPATIENT)
Dept: FAMILY MEDICINE | Facility: CLINIC | Age: 45
End: 2020-01-03

## 2020-01-03 DIAGNOSIS — F32.1 MAJOR DEPRESSIVE DISORDER, SINGLE EPISODE, MODERATE (H): ICD-10-CM

## 2020-01-03 DIAGNOSIS — E11.9 TYPE 2 DIABETES MELLITUS WITHOUT COMPLICATION, WITHOUT LONG-TERM CURRENT USE OF INSULIN (H): ICD-10-CM

## 2020-01-03 DIAGNOSIS — E78.5 HYPERLIPIDEMIA LDL GOAL <70: ICD-10-CM

## 2020-01-03 DIAGNOSIS — F41.9 ANXIETY: ICD-10-CM

## 2020-01-03 NOTE — TELEPHONE ENCOUNTER
"Requested Prescriptions   Pending Prescriptions Disp Refills     sertraline (ZOLOFT) 50 MG tablet        Last Written Prescription Date:  6.5.19  Last Fill Quantity: 180 tablet,  # refills: 3   Last office visit: 7/10/2019 with prescribing provider:  Rex Yee MD           Future Office Visit:       180 tablet 3     Sig: Take 2 tablets (100 mg) by mouth daily       SSRIs Protocol Failed - 1/3/2020  1:16 PM        Failed - PHQ-9 score less than 5 in past 6 months     Please review last PHQ-9 score.       PHQ-9 SCORE 6/5/2019 7/11/2019 11/20/2019   PHQ-9 Total Score - - -   PHQ-9 Total Score MyChart - - 5 (Mild depression)   PHQ-9 Total Score 8 5 5     LAWRENCE-7 SCORE 6/5/2019 7/11/2019 11/20/2019   Total Score - - -   Total Score - - 4 (minimal anxiety)   Total Score 6 5 4                 Passed - Medication is active on med list        Passed - Patient is age 18 or older        Passed - Recent (6 mo) or future (30 days) visit within the authorizing provider's specialty     Patient had office visit in the last 6 months or has a visit in the next 30 days with authorizing provider or within the authorizing provider's specialty.  See \"Patient Info\" tab in inbasket, or \"Choose Columns\" in Meds & Orders section of the refill encounter.            metFORMIN (GLUCOPHAGE) 500 MG tablet            Last Written Prescription Date:  7.10.19  Last Fill Quantity: 360 tablet,  # refills: 3   Last office visit: 7/10/2019 with prescribing provider:  Rex Yee MD             Future Office Visit:       360 tablet 3     Sig: Take 2 tablets (1,000 mg) by mouth 2 times daily (with meals)       Biguanide Agents Passed - 1/3/2020  1:16 PM        Passed - Blood pressure less than 140/90 in past 6 months     BP Readings from Last 3 Encounters:   07/10/19 126/70   05/14/19 118/72   03/20/19 126/80                 Passed - Patient has documented LDL within the past 12 mos.     Recent Labs   Lab Test 08/28/19  0733   LDL 73             Passed - " "Patient has had a Microalbumin in the past 15 mos.     Recent Labs   Lab Test 08/28/19  0740   MICROL 5   UMALCR 3.54             Passed - Patient is age 10 or older        Passed - Patient has documented A1c within the specified period of time.     If HgbA1C is 8 or greater, it needs to be on file within the past 3 months.  If less than 8, must be on file within the past 6 months.     Recent Labs   Lab Test 08/28/19  0733   A1C 6.0*             Passed - Patient's CR is NOT>1.4 OR Patient's EGFR is NOT<45 within past 12 mos.     Recent Labs   Lab Test 08/28/19  0733   GFRESTIMATED >90   GFRESTBLACK >90       Recent Labs   Lab Test 08/28/19  0733   CR 0.86             Passed - Patient does NOT have a diagnosis of CHF.        Passed - Medication is active on med list        Passed - Recent (6 mo) or future (30 days) visit within the authorizing provider's specialty     Patient had office visit in the last 6 months or has a visit in the next 30 days with authorizing provider or within the authorizing provider's specialty.  See \"Patient Info\" tab in inbasket, or \"Choose Columns\" in Meds & Orders section of the refill encounter.            rosuvastatin (CRESTOR) 10 MG tablet          Last Written Prescription Date:  10.16.19  Last Fill Quantity: 90 tablet,  # refills: 2   Last office visit: 7/10/2019 with prescribing provider:  Rex eYe MD             Future Office Visit:       90 tablet 2     Sig: Take 1 tablet (10 mg) by mouth daily       Statins Protocol Passed - 1/3/2020  1:16 PM        Passed - LDL on file in past 12 months     Recent Labs   Lab Test 08/28/19  0733   LDL 73             Passed - No abnormal creatine kinase in past 12 months     Recent Labs   Lab Test 08/28/19  0733                   Passed - Recent (12 mo) or future (30 days) visit within the authorizing provider's specialty     Patient has had an office visit with the authorizing provider or a provider within the authorizing providers " "department within the previous 12 mos or has a future within next 30 days. See \"Patient Info\" tab in inbasket, or \"Choose Columns\" in Meds & Orders section of the refill encounter.              Passed - Medication is active on med list        Passed - Patient is age 18 or older        "

## 2020-01-06 RX ORDER — ROSUVASTATIN CALCIUM 10 MG/1
10 TABLET, COATED ORAL DAILY
Qty: 90 TABLET | Refills: 2 | Status: SHIPPED | OUTPATIENT
Start: 2020-01-06 | End: 2020-06-05

## 2020-01-07 ENCOUNTER — TELEPHONE (OUTPATIENT)
Dept: FAMILY MEDICINE | Facility: CLINIC | Age: 45
End: 2020-01-07

## 2020-01-07 DIAGNOSIS — F32.1 MAJOR DEPRESSIVE DISORDER, SINGLE EPISODE, MODERATE (H): ICD-10-CM

## 2020-01-07 DIAGNOSIS — F41.9 ANXIETY: ICD-10-CM

## 2020-01-07 RX ORDER — SERTRALINE HYDROCHLORIDE 100 MG/1
100 TABLET, FILM COATED ORAL DAILY
Qty: 30 TABLET | Refills: 0 | Status: SHIPPED | OUTPATIENT
Start: 2020-01-07 | End: 2020-06-05

## 2020-01-07 NOTE — TELEPHONE ENCOUNTER
Prior Authorization Retail Medication Request    Medication/Dose: SERTRALINE 50 MG-TWICE DAILY -CHANGE  MG QD OR PA FOR TWICE DAILY  ICD code (if different than what is on RX):  F32.1 f41.9   Previously Tried and Failed:  UNKNOWN    Rationale:  UNKNOWN    Insurance Name:  [facility]  Insurance ID:  [id number]     Thank You,  Nita Esparza [facility]  [phone]

## 2020-01-07 NOTE — TELEPHONE ENCOUNTER
Insurance will not cover two of the 50 mg tablets per day.  New order sent for 100 mg tablet.      MERT Ray, RN, PHN  [facility]  Office: [phone]  Fax: [phone]

## 2020-01-07 NOTE — TELEPHONE ENCOUNTER
Due for an Office visit for further refills, only fill for 30 days     Annette Peacock RN, BSN  [facility]

## 2020-01-31 ENCOUNTER — TELEPHONE (OUTPATIENT)
Dept: FAMILY MEDICINE | Facility: CLINIC | Age: 45
End: 2020-01-31

## 2020-03-22 ENCOUNTER — HEALTH MAINTENANCE LETTER (OUTPATIENT)
Age: 45
End: 2020-03-22

## 2020-03-24 ENCOUNTER — MYC MEDICAL ADVICE (OUTPATIENT)
Dept: FAMILY MEDICINE | Facility: CLINIC | Age: 45
End: 2020-03-24

## 2020-06-02 ENCOUNTER — MYC MEDICAL ADVICE (OUTPATIENT)
Dept: FAMILY MEDICINE | Facility: CLINIC | Age: 45
End: 2020-06-02

## 2020-06-02 NOTE — TELEPHONE ENCOUNTER
Please see my chart message below     Please review and advise     Thank you     Annette Peacock RN, BSN  [facility]

## 2020-06-02 NOTE — TELEPHONE ENCOUNTER
Advise phone/video visit soon    Consider decrease metformin long term, consider adding glimepiride    Trial of omeprazole 20 mg daily OTC    Lab Results   Component Value Date    A1C 6.0 08/28/2019    A1C 5.8 05/14/2019    A1C 10.6 02/08/2019    A1C 4.8 02/27/2017     Recent Labs   Lab Test 08/28/19  0733 05/14/19  1036  02/16/15  1047   CHOL 147 158   < > 158   HDL 34* 42   < > 39*   LDL 73 101*   < > 76   TRIG 199* 75   < > 215*   CHOLHDLRATIO  --   --   --  4.1    < > = values in this interval not displayed.     Creatinine   Date Value Ref Range Status   08/28/2019 0.86 0.66 - 1.25 mg/dL Final     BP Readings from Last 3 Encounters:   07/10/19 126/70   05/14/19 118/72   03/20/19 126/80

## 2020-06-05 ENCOUNTER — VIRTUAL VISIT (OUTPATIENT)
Dept: FAMILY MEDICINE | Facility: CLINIC | Age: 45
End: 2020-06-05
Payer: COMMERCIAL

## 2020-06-05 DIAGNOSIS — E78.5 HYPERLIPIDEMIA LDL GOAL <70: ICD-10-CM

## 2020-06-05 DIAGNOSIS — E11.9 TYPE 2 DIABETES, HBA1C GOAL < 7% (H): ICD-10-CM

## 2020-06-05 DIAGNOSIS — F41.9 ANXIETY: ICD-10-CM

## 2020-06-05 DIAGNOSIS — F32.1 MAJOR DEPRESSIVE DISORDER, SINGLE EPISODE, MODERATE (H): ICD-10-CM

## 2020-06-05 DIAGNOSIS — E11.9 TYPE 2 DIABETES MELLITUS WITHOUT COMPLICATION, WITHOUT LONG-TERM CURRENT USE OF INSULIN (H): Primary | ICD-10-CM

## 2020-06-05 DIAGNOSIS — Z00.00 ENCOUNTER FOR ROUTINE ADULT HEALTH EXAMINATION WITHOUT ABNORMAL FINDINGS: ICD-10-CM

## 2020-06-05 DIAGNOSIS — Z12.5 SCREENING FOR PROSTATE CANCER: ICD-10-CM

## 2020-06-05 DIAGNOSIS — Z51.81 MEDICATION MONITORING ENCOUNTER: ICD-10-CM

## 2020-06-05 DIAGNOSIS — Z12.11 SCREEN FOR COLON CANCER: ICD-10-CM

## 2020-06-05 PROCEDURE — 96127 BRIEF EMOTIONAL/BEHAV ASSMT: CPT | Mod: 95 | Performed by: FAMILY MEDICINE

## 2020-06-05 PROCEDURE — 99214 OFFICE O/P EST MOD 30 MIN: CPT | Mod: 95 | Performed by: FAMILY MEDICINE

## 2020-06-05 RX ORDER — ROSUVASTATIN CALCIUM 10 MG/1
10 TABLET, COATED ORAL DAILY
Qty: 90 TABLET | Refills: 1 | Status: SHIPPED | OUTPATIENT
Start: 2020-06-05

## 2020-06-05 RX ORDER — SERTRALINE HYDROCHLORIDE 100 MG/1
100 TABLET, FILM COATED ORAL DAILY
Qty: 90 TABLET | Refills: 1 | Status: SHIPPED | OUTPATIENT
Start: 2020-06-05

## 2020-06-05 ASSESSMENT — ANXIETY QUESTIONNAIRES
6. BECOMING EASILY ANNOYED OR IRRITABLE: SEVERAL DAYS
1. FEELING NERVOUS, ANXIOUS, OR ON EDGE: SEVERAL DAYS
2. NOT BEING ABLE TO STOP OR CONTROL WORRYING: NOT AT ALL
5. BEING SO RESTLESS THAT IT IS HARD TO SIT STILL: SEVERAL DAYS
7. FEELING AFRAID AS IF SOMETHING AWFUL MIGHT HAPPEN: SEVERAL DAYS
IF YOU CHECKED OFF ANY PROBLEMS ON THIS QUESTIONNAIRE, HOW DIFFICULT HAVE THESE PROBLEMS MADE IT FOR YOU TO DO YOUR WORK, TAKE CARE OF THINGS AT HOME, OR GET ALONG WITH OTHER PEOPLE: SOMEWHAT DIFFICULT
GAD7 TOTAL SCORE: 10
3. WORRYING TOO MUCH ABOUT DIFFERENT THINGS: NEARLY EVERY DAY

## 2020-06-05 ASSESSMENT — PATIENT HEALTH QUESTIONNAIRE - PHQ9
SUM OF ALL RESPONSES TO PHQ QUESTIONS 1-9: 4
5. POOR APPETITE OR OVEREATING: NEARLY EVERY DAY

## 2020-06-05 NOTE — PROGRESS NOTES
[facility] [place]    Telephone visit  -     Subjective    Vlad VIKA Burgess is a 44 year old male who is being evaluated via a billable telephone visit.      Chief Complaint   Patient presents with     med check     Diabetes Follow-up    How often are you checking your blood sugar? One time daily  What time of day are you checking your blood sugars (select all that apply)?  morning or after lunch  Have you had any blood sugars above 200?  Yes  Have you had any blood sugars below 70?  No    What symptoms do you notice when your blood sugar is low?  None    What concerns do you have today about your diabetes? Blood sugar is often over 200     Do you have any of these symptoms? (Select all that apply)  No numbness or tingling in feet.  No redness, sores or blisters on feet.  No complaints of excessive thirst.  No reports of blurry vision.  No significant changes to weight.    Have you had a diabetic eye exam in the last 12 months? No    Range 130 to 320  Average fasting 225            Lab Results   Component Value Date    A1C 6.0 08/28/2019    A1C 5.8 05/14/2019    A1C 10.6 02/08/2019    A1C 4.8 02/27/2017     Hyperlipidemia Follow-Up      Are you regularly taking any medication or supplement to lower your cholesterol?   Yes- statin    Are you having muscle aches or other side effects that you think could be caused by your cholesterol lowering medication?  No     Recent Labs   Lab Test 08/28/19  0733 05/14/19  1036  02/16/15  1047   CHOL 147 158   < > 158   HDL 34* 42   < > 39*   LDL 73 101*   < > 76   TRIG 199* 75   < > 215*   CHOLHDLRATIO  --   --   --  4.1    < > = values in this interval not displayed.     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 Improved     How are you doing with your anxiety since your last visit?  Improved    Are you having other symptoms that might be associated with depression or anxiety? No    Have you had a significant life event? Grief or Loss     Do you have any concerns with your use of alcohol or other drugs? No    Social History     Tobacco Use     Smoking status: Former Smoker     Packs/day: 0.50     Years: 15.00     Pack years: 7.50     Types: Cigarettes     Last attempt to quit: 10/14/2007     Years since quittin.6     Smokeless tobacco: Never Used   Substance Use Topics     Alcohol use: Yes     Alcohol/week: 0.0 - 1.0 standard drinks     Comment: 2-3 drinks per month avg     Drug use: No     PHQ 2019   PHQ-9 Total Score 5 5 4   Q9: Thoughts of better off dead/self-harm past 2 weeks Not at all Not at all Not at all     LAWRENCE-7 SCORE 2019   Total Score - - -   Total Score - 4 (minimal anxiety) -   Total Score 5 4 10     Suicide Assessment Five-step Evaluation and Treatment (SAFE-T)    Psoriasis    [facility]    Past Medical History:   Diagnosis Date     Alopecia areata 10/07    dr thomason     Anxiety      Hyperlipidemia LDL goal <70      Leg length discrepancy      Major depressive disorder, single episode, moderate (H)      Morbid obesity (H)      Prediabetes      Psoriasis      Type 2 diabetes, HbA1c goal < 7% (H) 2019       [facility]    Past Surgical History:   Procedure Laterality Date     PE TUBES       SURGICAL HISTORY OF -       wisdom teeth       Medications    Current Outpatient Medications   Medication Sig Dispense Refill     blood glucose (NO BRAND SPECIFIED) test strip Test 1-2 times daily 200 strip 12     blood glucose monitoring (DARIELA MICROLET) lancets Use to test blood sugar 1-2 times daily or as directed. 100 each 1     metFORMIN (GLUCOPHAGE) 500 MG tablet Take 2 tablets (1,000 mg) by  mouth 2 times daily (with meals) 360 tablet 1     Multiple Vitamins-Minerals (MULTIVITAMIN ADULT PO)        rosuvastatin (CRESTOR) 10 MG tablet Take 1 tablet (10 mg) by mouth daily 90 tablet 1     sertraline (ZOLOFT) 100 MG tablet Take 1 tablet (100 mg) by mouth daily 90 tablet 1     glucosamine-chondroitin 500-400 MG CAPS per capsule Take 1 capsule by mouth daily         Allergies    Amoxicillin and Penicillins    Family History    Family History   Problem Relation Age of Onset     Diabetes Father      C.A.D. Father 72     Cancer Mother         lung cancer - age 45     C.A.D. Paternal Grandfather         age 40's     C.A.D. Maternal Grandmother      Diabetes Maternal Grandmother      Cerebrovascular Disease Maternal Grandfather      Other - See Comments Sister          in MVA     Diabetes Brother        Social History    Social History     Socioeconomic History     Marital status:      Spouse name: Suzan     Number of children: 3     Years of education: 14     Highest education level: Not on file   Occupational History     Employer: [employer]   Social Needs     Financial resource strain: Not on file     Food insecurity     Worry: Not on file     Inability: Not on file     Transportation needs     Medical: Not on file     Non-medical: Not on file   Tobacco Use     Smoking status: Former Smoker     Packs/day: 0.50     Years: 15.00     Pack years: 7.50     Types: Cigarettes     Last attempt to quit: 10/14/2007     Years since quittin.6     Smokeless tobacco: Never Used   Substance and Sexual Activity     Alcohol use: Yes     Alcohol/week: 0.0 - 1.0 standard drinks     Comment: 2-3 drinks per month avg     Drug use: No     Sexual activity: Yes     Partners: Female   Lifestyle     Physical activity     Days per week: Not on file     Minutes per session: Not on file     Stress: Not on file   Relationships     Social connections     Talks on phone: Not on file     Gets together: Not on file      Attends Mosque service: Not on file     Active member of club or organization: Not on file     Attends meetings of clubs or organizations: Not on file     Relationship status: Not on file     Intimate partner violence     Fear of current or ex partner: Not on file     Emotionally abused: Not on file     Physically abused: Not on file     Forced sexual activity: Not on file   Other Topics Concern      Service Not Asked     Blood Transfusions Not Asked     Caffeine Concern Yes     Comment: 1-2 cans qd     Occupational Exposure Not Asked     Hobby Hazards Not Asked     Sleep Concern Not Asked     Stress Concern Not Asked     Weight Concern Not Asked     Special Diet Not Asked     Back Care Not Asked     Exercise Yes     Comment: work physical     Bike Helmet Not Asked     Seat Belt Yes     Self-Exams Not Asked     Parent/sibling w/ CABG, MI or angioplasty before 65F 55M? No   Social History Narrative     Not on file       Reviewed and updated as needed this visit by Provider           Review of Systems     CONSTITUTIONAL: NEGATIVE for fever, chills, change in weight  INTEGUMENTARY/SKIN: NEGATIVE for worrisome rashes, moles or lesions  EYES: NEGATIVE for vision changes or irritation  ENT/MOUTH: NEGATIVE for ear, mouth and throat problems  RESP: NEGATIVE for significant cough or SOB  CV: NEGATIVE for chest pain, palpitations or peripheral edema  GI: NEGATIVE for nausea, abdominal pain, heartburn, or change in bowel habits  : NEGATIVE for frequency, dysuria, or hematuria  MUSCULOSKELETAL: NEGATIVE for significant arthralgias or myalgia  NEURO: NEGATIVE for weakness, dizziness or paresthesias  ENDOCRINE: NEGATIVE for temperature intolerance, skin/hair changes  HEME: NEGATIVE for bleeding problems  PSYCHIATRIC: NEGATIVE for changes in mood or affect    Objective    Reported vitals:  There were no vitals taken for this visit.     healthy, alert and no distress  Psych: Alert and oriented times 3; coherent  speech, normal   rate and volume, able to articulate logical thoughts, able   to abstract reason, no tangential thoughts, no hallucinations   or delusions  His affect is normal     Diagnostic Test Results:  Labs reviewed in Epic    Assessment/Plan:      ICD-10-CM    1. Type 2 diabetes mellitus without complication, without long-term current use of insulin (H)  E11.9 Hemoglobin A1c     Comprehensive metabolic panel     Lipid panel reflex to direct LDL Fasting     UA reflex to Microscopic and Culture     Albumin Random Urine Quantitative with Creat Ratio     metFORMIN (GLUCOPHAGE) 500 MG tablet     rosuvastatin (CRESTOR) 10 MG tablet   2. Type 2 diabetes, HbA1c goal < 7% (H)  E11.9 Hemoglobin A1c     Comprehensive metabolic panel     Lipid panel reflex to direct LDL Fasting     UA reflex to Microscopic and Culture     Albumin Random Urine Quantitative with Creat Ratio   3. Hyperlipidemia LDL goal <70  E78.5 Hemoglobin A1c     Comprehensive metabolic panel     Lipid panel reflex to direct LDL Fasting     rosuvastatin (CRESTOR) 10 MG tablet   4. Major depressive disorder, single episode, moderate (H)  F32.1 TSH with free T4 reflex     sertraline (ZOLOFT) 100 MG tablet   5. Anxiety  F41.9 TSH with free T4 reflex     sertraline (ZOLOFT) 100 MG tablet   6. Medication monitoring encounter  Z51.81 Hemoglobin A1c     Comprehensive metabolic panel     Lipid panel reflex to direct LDL Fasting     CK total     CBC with platelets     TSH with free T4 reflex     UA reflex to Microscopic and Culture     Albumin Random Urine Quantitative with Creat Ratio   7. Screening for prostate cancer  Z12.5 Prostate spec antigen screen   8. Screen for colon cancer  Z12.11 Fecal colorectal cancer screen FIT   9. Encounter for routine adult health examination without abnormal findings  Z00.00 Hemoglobin A1c     Comprehensive metabolic panel     Lipid panel reflex to direct LDL Fasting     CK total     CBC with platelets     TSH with free T4  "reflex     UA reflex to Microscopic and Culture     Albumin Random Urine Quantitative with Creat Ratio     Prostate spec antigen screen     Trial of metformin 500 mg TID  Med refills  May need glimepiride  Diet, exercise, weight loss  Fasting labs soon  CPX when able    No follow-ups on file.    Phone call duration:  21 minutes    The patient has been notified of following:     \"This telephone visit will be conducted via a call between you and your physician/provider. We have found that certain health care needs can be provided without the need for a physical exam.  This service lets us provide the care you need with a short phone conversation.  If a prescription is necessary we can send it directly to your pharmacy.  If lab work is needed we can place an order for that and you can then stop by our lab to have the test done at a later time.    Telephone visits are billed at different rates depending on your insurance coverage. During this emergency period, for some insurers they may be billed the same as an in-person visit.  Please reach out to your insurance provider with any questions.    If during the course of the call the physician/provider feels a telephone visit is not appropriate, you will not be charged for this service.\"    Patient has given verbal consent for Telephone visit?  Yes    How would you like to obtain your AVS? Rufino Yee MD, FAAFP     [facility]  [address] [place] [address]  [email or web address]   Office: [phone]  Fax: [phone]  Pager: [phone]     "

## 2020-06-05 NOTE — PROGRESS NOTES
"[facility] [place]    Video visit  - ***    Subjective    Vlad Burgess is a 44 year old male who is being evaluated via a billable video visit.      Patient would like the video invitation sent by: {video visit invitation:447183}    Video Start Time: {video visit start time for provider to select:295956}    No chief complaint on file.      ***    Diabetes Follow-up      How often are you checking your blood sugar? { :782745}    What concerns do you have today about your diabetes? { :122117::\"None\"}     Do you have any of these symptoms? (Select all that apply)  { :871492}    Have you had a diabetic eye exam in the last 12 months? { :488053}        BP Readings from Last 2 Encounters:   07/10/19 126/70   19 118/72     Hemoglobin A1C (%)   Date Value   2019 6.0 (H)   2019 5.8 (H)     LDL Cholesterol Calculated (mg/dL)   Date Value   2019 73   2019 101 (H)       {Reference  Diabetes Management Resources :424084}    {Reference  Diabetes Log - 7 days :968825}    Hyperlipidemia Follow-Up      Are you regularly taking any medication or supplement to lower your cholesterol?   { :601231}    Are you having muscle aches or other side effects that you think could be caused by your cholesterol lowering medication?  { :042617}    Depression and Anxiety Follow-Up    How are you doing with your depression since your last visit? { :884800::\"No change\"}    How are you doing with your anxiety since your last visit?  { :396807::\"No change\"}    Are you having other symptoms that might be associated with depression or anxiety? { :007957}    Have you had a significant life event? { :404775}     Do you have any concerns with your use of alcohol or other drugs? { :798412}    Social History     Tobacco Use     Smoking status: Former Smoker     Packs/day: 0.50     Years: 15.00     Pack years: 7.50     Types: Cigarettes     Last attempt to quit: 10/14/2007     Years since quittin.6     Smokeless " tobacco: Never Used   Substance Use Topics     Alcohol use: Yes     Alcohol/week: 0.0 - 1.0 standard drinks     Comment: 2-3 drinks per month avg     Drug use: No     PHQ 6/5/2019 7/11/2019 11/20/2019   PHQ-9 Total Score 8 5 5   Q9: Thoughts of better off dead/self-harm past 2 weeks Not at all Not at all Not at all     LAWRENCE-7 SCORE 6/5/2019 7/11/2019 11/20/2019   Total Score - - -   Total Score - - 4 (minimal anxiety)   Total Score 6 5 4     {Last PHQ9 or GAD7 Responses (Optional):511461}  {PROVIDER ONLY Complete follow-up questions for patients who report suicide ideation  (Optional):677023}    Suicide Assessment Five-step Evaluation and Treatment (SAFE-T)      How many servings of fruits and vegetables do you eat daily?  { :071678}    On average, how many sweetened beverages do you drink each day (Examples: soda, juice, sweet tea, etc.  Do NOT count diet or artificially sweetened beverages)?   { 1-11:115231}    How many days per week do you exercise enough to make your heart beat faster? { :548183}    How many minutes a day do you exercise enough to make your heart beat faster? { :953033}    How many days per week do you miss taking your medication? {0-7 :944377}      {PEDS Chronic and Acute Problems (Optional):390495}     {additonal problems for provider to add (Optional):257793}    {HIST REVIEW/ LINKS 2 (Optional):821814}    {additonal problems for provider to add (Optional):208815}    Reviewed and updated as needed this visit by Provider           [facility]    Past Medical History:   Diagnosis Date     Alopecia areata 10/07    dr thomason     Anxiety      Hyperlipidemia LDL goal <70      Leg length discrepancy      Major depressive disorder, single episode, moderate (H)      Morbid obesity (H)      Prediabetes      Psoriasis 2007     Type 2 diabetes, HbA1c goal < 7% (H) 01/2019       [facility]    Past Surgical History:   Procedure Laterality Date     PE TUBES  1983     SURGICAL HISTORY OF -   2002    wisdom teeth        Medications    Current Outpatient Medications   Medication Sig Dispense Refill     blood glucose (NO BRAND SPECIFIED) test strip Test 1-2 times daily 200 strip 12     blood glucose monitoring (DARIELA MICROLET) lancets Use to test blood sugar 1-2 times daily or as directed. 100 each 1     glucosamine-chondroitin 500-400 MG CAPS per capsule Take 1 capsule by mouth daily       metFORMIN (GLUCOPHAGE) 500 MG tablet Take 2 tablets (1,000 mg) by mouth 2 times daily (with meals) 120 tablet 0     Multiple Vitamins-Minerals (MULTIVITAMIN ADULT PO)        rosuvastatin (CRESTOR) 10 MG tablet Take 1 tablet (10 mg) by mouth daily 90 tablet 2     sertraline (ZOLOFT) 100 MG tablet Take 1 tablet (100 mg) by mouth daily 30 tablet 0       Allergies    Amoxicillin and Penicillins    Family History    Family History   Problem Relation Age of Onset     Diabetes Father      C.A.D. Father 72     Cancer Mother         lung cancer - age 45     C.A.D. Paternal Grandfather         age 40's     C.A.D. Maternal Grandmother      Diabetes Maternal Grandmother      Cerebrovascular Disease Maternal Grandfather      Other - See Comments Sister          in MVA     Diabetes Brother        Social History    Social History     Socioeconomic History     Marital status:      Spouse name: Suzan     Number of children: 3     Years of education: 14     Highest education level: Not on file   Occupational History     Employer: [employer]   Social Needs     Financial resource strain: Not on file     Food insecurity     Worry: Not on file     Inability: Not on file     Transportation needs     Medical: Not on file     Non-medical: Not on file   Tobacco Use     Smoking status: Former Smoker     Packs/day: 0.50     Years: 15.00     Pack years: 7.50     Types: Cigarettes     Last attempt to quit: 10/14/2007     Years since quittin.6     Smokeless tobacco: Never Used   Substance and Sexual Activity     Alcohol use: Yes      "Alcohol/week: 0.0 - 1.0 standard drinks     Comment: 2-3 drinks per month avg     Drug use: No     Sexual activity: Yes     Partners: Female   Lifestyle     Physical activity     Days per week: Not on file     Minutes per session: Not on file     Stress: Not on file   Relationships     Social connections     Talks on phone: Not on file     Gets together: Not on file     Attends Anabaptist service: Not on file     Active member of club or organization: Not on file     Attends meetings of clubs or organizations: Not on file     Relationship status: Not on file     Intimate partner violence     Fear of current or ex partner: Not on file     Emotionally abused: Not on file     Physically abused: Not on file     Forced sexual activity: Not on file   Other Topics Concern      Service Not Asked     Blood Transfusions Not Asked     Caffeine Concern Yes     Comment: 1-2 cans qd     Occupational Exposure Not Asked     Hobby Hazards Not Asked     Sleep Concern Not Asked     Stress Concern Not Asked     Weight Concern Not Asked     Special Diet Not Asked     Back Care Not Asked     Exercise Yes     Comment: work physical     Bike Helmet Not Asked     Seat Belt Yes     Self-Exams Not Asked     Parent/sibling w/ CABG, MI or angioplasty before 65F 55M? No   Social History Narrative     Not on file       Reviewed and updated as needed this visit by Provider           Review of Systems     {ROS COMP (Optional):137665}    Objective    Reported vitals:  There were no vitals taken for this visit.     {GENERAL APPEARANCE:50::\"healthy\",\"alert\",\"no distress\"}  Psych: Alert and oriented times 3; coherent speech, normal   rate and volume, able to articulate logical thoughts, able   to abstract reason, no tangential thoughts, no hallucinations   or delusions  His affect is ***     {video visit exam brief selected:643063::\"GENERAL: Healthy, alert and no distress\",\"EYES: Eyes grossly normal to inspection.  No discharge or erythema, or " "obvious scleral/conjunctival abnormalities.\",\"RESP: No audible wheeze, cough, or visible cyanosis.  No visible retractions or increased work of breathing.  \",\"SKIN: Visible skin clear. No significant rash, abnormal pigmentation or lesions.\",\"NEURO: Cranial nerves grossly intact.  Mentation and speech appropriate for age.\",\"PSYCH: Mentation appears normal, affect normal/bright, judgement and insight intact, normal speech and appearance well-groomed.\"}    {Diagnostic Test Results (Optional):964654::\"Diagnostic Test Results:\",\"Labs reviewed in Epic\"}    Assessment/Plan:    No diagnosis found.    ***    No follow-ups on file.    Video-Visit Details    Type of service:  Video Visit    Video Start Time: {video visit start time for provider to select:585411}  Video End Time (time video stopped): ***    Originating Location (pt. Location): {video visit patient location:395866::\"Home\"}    Distant Location (provider location):  [facility]     Mode of Communication:  Video Conference via cube19    The patient has been notified of following:     \"This video visit will be conducted via a call between you and your physician/provider. We have found that certain health care needs can be provided without the need for an in-person physical exam.  This service lets us provide the care you need with a video conversation.  If a prescription is necessary we can send it directly to your pharmacy.  If lab work is needed we can place an order for that and you can then stop by our lab to have the test done at a later time.    If during the course of the call the physician/provider feels a video visit is not appropriate, you will not be charged for this service.\"     Patient has given verbal consent for Video visit? {YES-NO  Default Yes:4444::\"Yes\"}             Rex Yee MD, FAA[facility]  [address] [place] [address]  [email or web address]  [email or web address]   Office: [phone]  Fax: [phone]  Pager: [phone]

## 2020-06-06 ASSESSMENT — ANXIETY QUESTIONNAIRES: GAD7 TOTAL SCORE: 10

## 2020-08-21 ENCOUNTER — E-VISIT (OUTPATIENT)
Dept: FAMILY MEDICINE | Facility: CLINIC | Age: 45
End: 2020-08-21
Payer: COMMERCIAL

## 2020-08-21 DIAGNOSIS — T78.40XA ALLERGIC REACTION, INITIAL ENCOUNTER: Primary | ICD-10-CM

## 2020-08-21 NOTE — TELEPHONE ENCOUNTER
Message handled by Nurse Triage with [employer] - provider name: Dr. Joselito Tse's baby shampoo, zyrtec in the am, benadryl @ [facility], let us know after the weekend how you are doing.    Rufino sent    Merissa Parham RN  [facility]  [place]

## 2021-01-15 ENCOUNTER — HEALTH MAINTENANCE LETTER (OUTPATIENT)
Age: 46
End: 2021-01-15

## 2021-03-12 ENCOUNTER — TELEPHONE (OUTPATIENT)
Dept: FAMILY MEDICINE | Facility: CLINIC | Age: 46
End: 2021-03-12

## 2021-03-12 NOTE — TELEPHONE ENCOUNTER
MTM referral from: Transitions of Care (recent hospital discharge or ED visit)    MTM referral outreach attempt #2 on March 12, 2021 at 12:18 PM      Outcome: Patient not reachable after several attempts, will route to MTM Pharmacist/Provider as an FYI. Thank you for the referral.    Referral is from [facility]    Andrade Javier MTM coordinator

## 2021-05-09 ENCOUNTER — HEALTH MAINTENANCE LETTER (OUTPATIENT)
Age: 46
End: 2021-05-09

## 2021-08-29 ENCOUNTER — HEALTH MAINTENANCE LETTER (OUTPATIENT)
Age: 46
End: 2021-08-29

## 2021-10-24 ENCOUNTER — HEALTH MAINTENANCE LETTER (OUTPATIENT)
Age: 46
End: 2021-10-24

## 2021-12-19 ENCOUNTER — HEALTH MAINTENANCE LETTER (OUTPATIENT)
Age: 46
End: 2021-12-19

## 2022-04-10 ENCOUNTER — HEALTH MAINTENANCE LETTER (OUTPATIENT)
Age: 47
End: 2022-04-10

## 2022-06-05 ENCOUNTER — HEALTH MAINTENANCE LETTER (OUTPATIENT)
Age: 47
End: 2022-06-05

## 2022-07-31 ENCOUNTER — HEALTH MAINTENANCE LETTER (OUTPATIENT)
Age: 47
End: 2022-07-31

## 2022-10-16 ENCOUNTER — HEALTH MAINTENANCE LETTER (OUTPATIENT)
Age: 47
End: 2022-10-16

## 2022-12-03 ENCOUNTER — HEALTH MAINTENANCE LETTER (OUTPATIENT)
Age: 47
End: 2022-12-03

## 2023-03-26 ENCOUNTER — HEALTH MAINTENANCE LETTER (OUTPATIENT)
Age: 48
End: 2023-03-26

## 2023-06-17 ENCOUNTER — HEALTH MAINTENANCE LETTER (OUTPATIENT)
Age: 48
End: 2023-06-17

## 2023-08-26 ENCOUNTER — HEALTH MAINTENANCE LETTER (OUTPATIENT)
Age: 48
End: 2023-08-26

## 2024-01-13 ENCOUNTER — HEALTH MAINTENANCE LETTER (OUTPATIENT)
Age: 49
End: 2024-01-13